# Patient Record
Sex: FEMALE | Race: WHITE | NOT HISPANIC OR LATINO | Employment: FULL TIME
[De-identification: names, ages, dates, MRNs, and addresses within clinical notes are randomized per-mention and may not be internally consistent; named-entity substitution may affect disease eponyms.]

---

## 2017-06-03 ENCOUNTER — HEALTH MAINTENANCE LETTER (OUTPATIENT)
Age: 52
End: 2017-06-03

## 2018-06-26 ASSESSMENT — ENCOUNTER SYMPTOMS
POLYPHAGIA: 0
FEVER: 1
HOARSE VOICE: 1
SINUS PAIN: 0
SORE THROAT: 0
HEMOPTYSIS: 0
WHEEZING: 0
DYSPNEA ON EXERTION: 0
NECK PAIN: 0
ALTERED TEMPERATURE REGULATION: 0
SINUS CONGESTION: 1
HALLUCINATIONS: 0
COUGH: 1
JOINT SWELLING: 1
STIFFNESS: 1
SNORES LOUDLY: 1
POSTURAL DYSPNEA: 0
SPUTUM PRODUCTION: 0
SMELL DISTURBANCE: 0
CHILLS: 0
TASTE DISTURBANCE: 0
FATIGUE: 1
WEIGHT GAIN: 0
COUGH DISTURBING SLEEP: 1
MUSCLE CRAMPS: 1
POLYDIPSIA: 0
WEIGHT LOSS: 0
TROUBLE SWALLOWING: 0
MUSCLE WEAKNESS: 0
DECREASED APPETITE: 0
NECK MASS: 0
INCREASED ENERGY: 1
BACK PAIN: 0
MYALGIAS: 1
ARTHRALGIAS: 1
SHORTNESS OF BREATH: 0
NIGHT SWEATS: 1

## 2018-06-28 ENCOUNTER — OFFICE VISIT (OUTPATIENT)
Dept: INFECTIOUS DISEASES | Facility: CLINIC | Age: 53
End: 2018-06-28
Attending: INTERNAL MEDICINE
Payer: COMMERCIAL

## 2018-06-28 VITALS
OXYGEN SATURATION: 95 % | HEIGHT: 66 IN | WEIGHT: 196.5 LBS | DIASTOLIC BLOOD PRESSURE: 82 MMHG | TEMPERATURE: 98.7 F | SYSTOLIC BLOOD PRESSURE: 118 MMHG | HEART RATE: 63 BPM | RESPIRATION RATE: 18 BRPM | BODY MASS INDEX: 31.58 KG/M2

## 2018-06-28 DIAGNOSIS — D84.9 IMMUNOSUPPRESSED STATUS (H): ICD-10-CM

## 2018-06-28 DIAGNOSIS — M06.9 RHEUMATOID ARTHRITIS INVOLVING MULTIPLE SITES, UNSPECIFIED RHEUMATOID FACTOR PRESENCE: ICD-10-CM

## 2018-06-28 DIAGNOSIS — B38.9 COCCIDIOIDOMYCOSIS: Primary | ICD-10-CM

## 2018-06-28 DIAGNOSIS — B38.9 COCCIDIOIDOMYCOSIS: ICD-10-CM

## 2018-06-28 LAB
ALBUMIN SERPL-MCNC: 4.4 G/DL (ref 3.4–5)
ALP SERPL-CCNC: 104 U/L (ref 40–150)
ALT SERPL W P-5'-P-CCNC: 35 U/L (ref 0–50)
ANION GAP SERPL CALCULATED.3IONS-SCNC: 5 MMOL/L (ref 3–14)
AST SERPL W P-5'-P-CCNC: 28 U/L (ref 0–45)
BASOPHILS # BLD AUTO: 0 10E9/L (ref 0–0.2)
BASOPHILS NFR BLD AUTO: 0.3 %
BILIRUB SERPL-MCNC: 0.4 MG/DL (ref 0.2–1.3)
BUN SERPL-MCNC: 22 MG/DL (ref 7–30)
CALCIUM SERPL-MCNC: 10.4 MG/DL (ref 8.5–10.1)
CHLORIDE SERPL-SCNC: 106 MMOL/L (ref 94–109)
CO2 SERPL-SCNC: 29 MMOL/L (ref 20–32)
CREAT SERPL-MCNC: 0.82 MG/DL (ref 0.52–1.04)
DIFFERENTIAL METHOD BLD: NORMAL
EOSINOPHIL # BLD AUTO: 0.1 10E9/L (ref 0–0.7)
EOSINOPHIL NFR BLD AUTO: 1.2 %
ERYTHROCYTE [DISTWIDTH] IN BLOOD BY AUTOMATED COUNT: 11.9 % (ref 10–15)
GFR SERPL CREATININE-BSD FRML MDRD: 73 ML/MIN/1.7M2
GLUCOSE SERPL-MCNC: 88 MG/DL (ref 70–99)
HCT VFR BLD AUTO: 45.3 % (ref 35–47)
HGB BLD-MCNC: 15.5 G/DL (ref 11.7–15.7)
IMM GRANULOCYTES # BLD: 0 10E9/L (ref 0–0.4)
IMM GRANULOCYTES NFR BLD: 0.2 %
LYMPHOCYTES # BLD AUTO: 2.7 10E9/L (ref 0.8–5.3)
LYMPHOCYTES NFR BLD AUTO: 43.9 %
MCH RBC QN AUTO: 31.6 PG (ref 26.5–33)
MCHC RBC AUTO-ENTMCNC: 34.2 G/DL (ref 31.5–36.5)
MCV RBC AUTO: 92 FL (ref 78–100)
MONOCYTES # BLD AUTO: 0.4 10E9/L (ref 0–1.3)
MONOCYTES NFR BLD AUTO: 7 %
NEUTROPHILS # BLD AUTO: 2.9 10E9/L (ref 1.6–8.3)
NEUTROPHILS NFR BLD AUTO: 47.4 %
NRBC # BLD AUTO: 0 10*3/UL
NRBC BLD AUTO-RTO: 0 /100
PLATELET # BLD AUTO: 180 10E9/L (ref 150–450)
POTASSIUM SERPL-SCNC: 4.8 MMOL/L (ref 3.4–5.3)
PROT SERPL-MCNC: 8.1 G/DL (ref 6.8–8.8)
RBC # BLD AUTO: 4.9 10E12/L (ref 3.8–5.2)
SODIUM SERPL-SCNC: 141 MMOL/L (ref 133–144)
WBC # BLD AUTO: 6 10E9/L (ref 4–11)

## 2018-06-28 PROCEDURE — G0463 HOSPITAL OUTPT CLINIC VISIT: HCPCS | Mod: ZF

## 2018-06-28 PROCEDURE — 85025 COMPLETE CBC W/AUTO DIFF WBC: CPT | Performed by: INTERNAL MEDICINE

## 2018-06-28 PROCEDURE — 36415 COLL VENOUS BLD VENIPUNCTURE: CPT | Performed by: INTERNAL MEDICINE

## 2018-06-28 PROCEDURE — 80053 COMPREHEN METABOLIC PANEL: CPT | Performed by: INTERNAL MEDICINE

## 2018-06-28 RX ORDER — FLUCONAZOLE 200 MG/1
400 TABLET ORAL AT BEDTIME
Qty: 60 TABLET | Refills: 11 | Status: SHIPPED | OUTPATIENT
Start: 2018-06-28 | End: 2018-09-26

## 2018-06-28 RX ORDER — ABATACEPT 125 MG/ML
125 INJECTION, SOLUTION SUBCUTANEOUS WEEKLY
COMMUNITY
Start: 2018-06-16 | End: 2023-07-17

## 2018-06-28 RX ORDER — FLUCONAZOLE 100 MG/1
100 TABLET ORAL DAILY
COMMUNITY
Start: 2018-06-27 | End: 2018-09-26

## 2018-06-28 ASSESSMENT — PAIN SCALES - GENERAL: PAINLEVEL: NO PAIN (0)

## 2018-06-28 NOTE — NURSING NOTE
"Chief Complaint   Patient presents with     Consult     Coccidioides infection       /82 (BP Location: Right arm, Patient Position: Sitting, Cuff Size: Adult Large)  Pulse 63  Temp 98.7  F (37.1  C) (Oral)  Resp 18  Ht 1.664 m (5' 5.5\")  Wt 89.1 kg (196 lb 8 oz)  SpO2 95%  BMI 32.2 kg/m2    Sindi Gomes [facility]  6/28/2018 4:38 PM      "

## 2018-06-28 NOTE — LETTER
6/28/2018      RE: Kady Marshalltvliet  [address] [facility] [address]       No notes on file    Errol Rashid MD

## 2018-06-28 NOTE — LETTER
"6/28/2018    RE: Kady Benson  [address] [facility] [address]       Division of Infectious Diseases and International Medicine  Department of Medicine    INFECTIOUS DISEASE CLINIC   OUTPATIENT CONSULTATION NOTE [place] Mail Code [address] [facility] [place] [address]  Office: [phone]  Fax:  [phone]     HISTORY OF PRESENT ILLNESS:  Ms. Kady Benson is a pleasant 53-year-old woman with with Rheumatoid Arthritis who is referred to Infectious Disease Clinic today by Dr. Taylor Lopez of [facility] and Dr. Ravi Luna of [facility], for ID consultation regarding a recent diagnosis of coccidioidomycosis.  She has taken Humira through Dr. Luna for the past two years or so.  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  Since late winter, she has had a persistent coarse cough that she attributed, while still in [place], to ongoing allergies to desert plants or dust.  She also had a \"nasty\" erythematous-papular face rash in the late winter that has subsequently fully resolved.  She had some mild fatigue, but no other constitutional symptoms, including no fever, chills, night sweats, anorexia, or weight loss, and also no other pulmonary symptoms, including dyspnea or chest discomfort.  [place] in [place], the cough was still quite troublesome and not improving after more than a couple of months, and she lacked other allergy symptoms, so she was seen in [place] where some tests for chronic pulmonary infections were performed. I do not have access to those tests at present (I cannot find the appropriate system in Care Everywhere and I do not see that we have yet " "received a fax from Dr. Cervantes's office with the records she obtained from [place]), but per the patient and an [facility] chart Telephone Note (in Care Everywhere) from 5/31/18 by Dr. Lopez, those [place] tests were reportedly all negative except for a \"culture\" that apparently grew Coccidioides imitis (or perhaps it was a positive antibody assay).  Ms. Benson phoned Dr. Lopez's office on 5/31/18 asking for treatment for this infection and fluconazole 100 mg PO daily was prescribed, which she started taking three weeks ago.  The fluconazole makes her feel \"dizzy\", but she can tolerate that side effect easily by taking the fuconazole dose at bedtime, and with that dosing plan, she has not experienced any other drug side effects. In addition to initiating the antifungal, Dr. Lopez also referred her to our ID Clinic for today's consultation.  Because of the cocci diagnosis, Dr. Luna discontinued her long-standing Humira on 5/29/18 and she was off any biologic therapy for about three weeks.  She then resumed treatment for her rheumatoid arthritis with Orencia injections instead on 6/22/18.  Ms. Benson today says that her cough has markedly slowly improved and almost resolved over the past two weeks and that she now feels pretty well.  She says she feels the best in the past couple of days or so that she has felt in several months.  She has no recent constitutional symptoms, including no fever, chills, night sweats, anorexia, or fatigue.  She now has no other complaints today, including no recent rash, myalgias/arthralgias, significant exertional dyspnea, EENT symptoms, chest pain, nausea, diarrhea, abdominal pain, genitourinary symptoms, headache or other neurological symptoms.    PAST MEDICAL HISTORY:  Past sinusitis  History of sarcoidosis in asymptomatic remission for a number of years.  Past Medical History:   Diagnosis Date     Angle recession glaucoma of left eye      Arthropathy     multiple " sites     Depression      Glaucoma suspect      RA (rheumatoid arthritis) (H) 1/4/2012     Past Surgical History:   Procedure Laterality Date     APPENDECTOMY       ARTHROPLASTY KNEE  11/29/2012    Procedure: ARTHROPLASTY KNEE;  Right Total Knee Arthroplasty,  Left Knee Steroid Injection   ;  Surgeon: Florence Billings MD;  [facility]     ARTHROSCOPY ANKLE  3/26/2014    Procedure: ARTHROSCOPY ANKLE;;  Surgeon: Christopher Peres MD;  [facility]     ARTHROTOMY FOOT  3/26/2014    Procedure: ARTHROTOMY FOOT;  Right 1st Metatarsal Phalangeal Joint Debridement, Left Ankle Arthroscopy & Debridement, Left 2nd And 3rd Metatarsal Shortening, Left 2nd And 3rd Hammer Toe Correction Surgery   ;  Surgeon: Christopher Peres MD;  [facility]     GYN SURGERY       INJECT STEROID (LOCATION)  11/29/2012    Procedure: INJECT STEROID (LOCATION);;  Surgeon: Florence Billings MD;  [facility]     MEDIASTINOSCOPY N/A 8/20/2014    Procedure: MEDIASTINOSCOPY;  Surgeon: Christiano Hernandez MD;  [facility]     ORTHOPEDIC SURGERY       REPAIR HAMMER TOE  3/26/2014    Procedure: REPAIR HAMMER TOE;;  Surgeon: Christopher Peres MD;  [facility]   NAHOMY with RSO (for endometriosis) 2005    ALLERGIES:  Allergies   Allergen Reactions     Shellfish Allergy Anaphylaxis     Iodine-131 Other (See Comments)     Sulfasalazine Rash     Vancomycin Rash     Wellbutrin [Bupropion] Rash     MEDICATIONS:  Fluconazole dose increased today from 100 mg PO daily to 400 mg PO daily.  Current Outpatient Prescriptions   Medication Sig Dispense Refill     acetaminophen (TYLENOL ARTHRITIS PAIN) 650 MG CR tablet Take 650 mg by mouth every 8 hours as needed.       amoxicillin (AMOXIL) 500 MG tablet Take 2 grams one hour before procedure 4 tablet 2     Artificial Tear Solution (HYPOTEARS PF OP) Apply  to eye as needed.       CALCIUM PO Take 1 tablet by mouth daily       fish oil-omega-3 fatty acids 1000 MG capsule Take  "2 g by mouth daily       fluconazole (DIFLUCAN) 100 MG tablet Take 100 mg by mouth daily       fluconazole (DIFLUCAN) 200 MG tablet Take 2 tablets (400 mg) by mouth At Bedtime 60 tablet 11     glucosamine-chondroitin 500-400 MG CAPS Take 1 capsule by mouth daily Shellfish free       MAGNESIUM PO Take 1 tablet by mouth 2 times daily        Milk Thistle 175 MG TABS Take by mouth daily       Multiple Vitamin (MULTIVITAMIN) per tablet Take 1 tablet by mouth daily.       naproxen sodium (ALEVE) 220 MG capsule Take 220 mg by mouth daily as needed (pain)        ORENCIA CLICKJECT 125 MG/ML SOAJ auto-injector Take 125 mg by mouth once a week        Turmeric 500 MG CAPS Take by mouth daily        SOCIAL HISTORY:  Social History     Social History     Marital status: Single     Spouse name: N/A     Number of children: N/A     Years of education: N/A     Occupational History     Not on file.     Social History Main Topics     Smoking status: Never Smoker     Smokeless tobacco: Never Used     Alcohol use Yes      Comment: 1 a day     Drug use: No     Sexual activity: Not on file     Other Topics Concern     Not on file     Social History Narrative   Retired clinic manager and lymphoma researcher.  Accompanied to clinic today by her spouse, Aleena.    FAMILY HISTORY:  Family History   Problem Relation Age of Onset     Uterine Cancer Mother      Skin Cancer Father      HEART DISEASE Maternal Grandmother      HEART DISEASE Paternal Grandmother      Prostate Cancer Paternal Grandfather      Stomach Cancer Paternal Grandfather      REVIEW OF SYSTEMS:  As per HPI.  Complete ROS is otherwise negative.    PHYSICAL EXAMINATION:  General:  A personable, articulate, WDWN, 53-year-old woman in NAD.  Vital signs:  /82 (BP Location: Right arm, Patient Position: Sitting, Cuff Size: Adult Large)  Pulse 63  Temp 98.7  F (37.1  C) (Oral)  Resp 18  Ht 1.664 m (5' 5.5\")  Wt 89.1 kg (196 lb 8 oz)  SpO2 95%  BMI 32.2 kg/m2 .  Skin:  No " acute rash or lesion.  Head/Neck:  NCAT.  External auditory canals are patent without discharge.  PERRL.  EOMI.  Conjunctivae are pink without injection.  Sclerae are white.  Oropharynx lacks erythema, exudate, or lesion.  Dentition is in good repair.  Neck is supple without thyromegaly.  Lymph:  No cervical, supraclavicular, or axillary lymphadenopathy.  Lungs:  Bilaterally clear to auscultation.  CV:  RRR.  Normal S1, S2, without gallop, murmur, or rub.  Abdomen:  Bowel sounds active, soft, nontender, no hepatosplenomegaly.  Back:  No lumbar or CVA tenderness.  Extremities:  Distally warm, no edema.  Neuro:  Alert, oriented, memory/cognition/affect are normal, gait is normal.    LABORATORY STUDIES:      WBC 06/28/2018 6.0  4.0 - 11.0 10e9/L Final     RBC Count 06/28/2018 4.90  3.8 - 5.2 10e12/L Final     Hemoglobin 06/28/2018 15.5  11.7 - 15.7 g/dL Final     Hematocrit 06/28/2018 45.3  35.0 - 47.0 % Final     MCV 06/28/2018 92  78 - 100 fl Final     MCH 06/28/2018 31.6  26.5 - 33.0 pg Final     MCHC 06/28/2018 34.2  31.5 - 36.5 g/dL Final     RDW 06/28/2018 11.9  10.0 - 15.0 % Final     Platelet Count 06/28/2018 180  150 - 450 10e9/L Final     Diff Method 06/28/2018 Automated Method   Final     % Neutrophils 06/28/2018 47.4  % Final     % Lymphocytes 06/28/2018 43.9  % Final     % Monocytes 06/28/2018 7.0  % Final     % Eosinophils 06/28/2018 1.2  % Final     % Basophils 06/28/2018 0.3  % Final     % Immature Granulocytes 06/28/2018 0.2  % Final     Nucleated RBCs 06/28/2018 0  0 /100 Final     Absolute Neutrophil 06/28/2018 2.9  1.6 - 8.3 10e9/L Final     Absolute Lymphocytes 06/28/2018 2.7  0.8 - 5.3 10e9/L Final     Absolute Monocytes 06/28/2018 0.4  0.0 - 1.3 10e9/L Final     Absolute Eosinophils 06/28/2018 0.1  0.0 - 0.7 10e9/L Final     Absolute Basophils 06/28/2018 0.0  0.0 - 0.2 10e9/L Final     Abs Immature Granulocytes 06/28/2018 0.0  0 - 0.4 10e9/L Final     Absolute Nucleated RBC 06/28/2018 0.0   Final      Sodium 06/28/2018 141  133 - 144 mmol/L Final     Potassium 06/28/2018 4.8  3.4 - 5.3 mmol/L Final     Chloride 06/28/2018 106  94 - 109 mmol/L Final     Carbon Dioxide 06/28/2018 29  20 - 32 mmol/L Final     Anion Gap 06/28/2018 5  3 - 14 mmol/L Final     Glucose 06/28/2018 88  70 - 99 mg/dL Final     Urea Nitrogen 06/28/2018 22  7 - 30 mg/dL Final     Creatinine 06/28/2018 0.82  0.52 - 1.04 mg/dL Final     GFR Estimate 06/28/2018 73  >60 mL/min/1.7m2 Final    Non  GFR Calc     GFR Estimate If Black 06/28/2018 88  >60 mL/min/1.7m2 Final    African American GFR Calc     Calcium 06/28/2018 10.4* 8.5 - 10.1 mg/dL Final     Bilirubin Total 06/28/2018 0.4  0.2 - 1.3 mg/dL Final     Albumin 06/28/2018 4.4  3.4 - 5.0 g/dL Final     Protein Total 06/28/2018 8.1  6.8 - 8.8 g/dL Final     Alkaline Phosphatase 06/28/2018 104  40 - 150 U/L Final     ALT 06/28/2018 35  0 - 50 U/L Final     AST 06/28/2018 28  0 - 45 U/L Final     Microbiology / Mycology assays from outside [facility] are not presently available to me.    IMPRESSION/PLAN:  A pleasant 53 year old woman on long-standing (until 5/29/18) immunosuppression with Humira for rheumatoid arthritis who lives much of the year in [place] presents to Infectious Disease Clinic today for consultation regarding a diagnosis of primary pulmonary coccidioidomycosis.  I am presently unable to evaluate the strength of this diagnosis, since the diagnostic laboratory results and radiology are not at the moment available to me.  But Ms. Benson is a sophisticated historian with medical knowledge and I have the confirmatory conclusion of her primary physician, Dr. Lopez's, on 5/31/18 in citing the diagnosis without doubt in a note (when she referred the patient here).  In addition, her history seems highly compatible with a diagnosis of [place] Fever in an individual on a biologic agent for immunosuppression.  With either the initiation of the low-dose fluconazole on ~  6/8/18 or with discontinuation of Humira on 5/29/18, she seems to be responding to therapy, which is also comfirmatory of the diagnosis of coccidioidomycosis.  We will try to obtain the additional documentation from Dr. Lopez's office soon, but I do not see a need to wait for that to now continue to treat Ms. Benson with full antifungal therapy.  Dr. Lopez provided fluconazole which is, indeed, apt first-line antifungal therapy, although the dose she has been taking is sub-optimal.  We will increase the dose to 400 mg PO daily -- we discussed that she can either take this as 200 mg PO BID, or, if she wishes, can take it as 400 mg PO qHS.  Since she has found that she can ameliorate the only Diflucan side effect she has experienced so far (dizziness) by taking the dose at bedtime, she will likely dose it that way.  She is strongly encouraged to phone me quickly if she develops worsened dizziness or any other possible new side effect on the increased fluconazole dose.  We obtained a baseline metabolic panel (especially LFTs) and CBC today and will plan to repeat those tests in two weeks (and again about four weeks later) on the higher fluconazole dose.  We discussed today that in someone on an ongoing biologic agent, such as Humira (or less so, but also Orencia which she is on now since 6/22/18), we usually treat primary pulmonary coccidioidomycosis for six to twelve months.  In the meanwhile, I do not think she needs to avoid appropriate immunosuppressive therapy for her RA, although, if she can maintain good arthritis control with Orencia, the literature suggests that may be a better agent from the standpoint of curing the coccidioidomycosis down the road.  We may likely repeat some chest imaging in about three to six months.  Assuming she tolerates the fluconazole well, she will continue taking it and return to ID Clinic for follow-up with me in about three months, or anytime needed before then.      Errol COLES  MD Gay

## 2018-06-28 NOTE — MR AVS SNAPSHOT
After Visit Summary   6/28/2018    Kady Benson    MRN: [id number]           Patient Information     Date Of Birth          1965        Visit Information        Provider Department      6/28/2018 4:15 PM Errol Rashid MD [facility] and Infectious Diseases        Today's Diagnoses     Coccidioidomycosis    -  1    Immunosuppressed status (H)        Rheumatoid arthritis involving multiple sites, unspecified rheumatoid factor presence (H)           Follow-ups after your visit        Follow-up notes from your care team     Return in about 3 months (around 9/28/2018).      Your next 10 appointments already scheduled     Sep 26, 2018  3:30 PM CDT   (Arrive by 3:15 PM)   [facility] ([facility]    [address] [place] [address]  [address]  [facility] [address] [phone]              Who to contact     If you have questions or need follow up information about today's clinic visit or your schedule please contact [facility] AND INFECTIOUS DISEASES directly at [phone].  Normal or non-critical lab and imaging results will be communicated to you by Jinnhart, letter or phone within 4 business days after the clinic has received the results. If you do not hear from us within 7 days, please contact the clinic through Jinnhart or phone. If you have a critical or abnormal lab result, we will notify you by phone as soon as possible.  Submit refill requests through Touchmedia or call your pharmacy and they will forward the refill request to us. Please allow 3 business days for your refill to be completed.          Additional Information About Your Visit        MyChart Information     Touchmedia gives you secure access to your electronic health record. If you see a primary care provider, you can also send messages to your care team and make appointments. If you have questions,  "please call your primary care clinic.  If you do not have a primary care provider, please call [phone] and they will assist you.        Care EveryWhere ID     This is your Care EveryWhere ID. This could be used by other organizations to access your [facility] medical records  [id number]        Your Vitals Were     Pulse Temperature Respirations Height Pulse Oximetry BMI (Body Mass Index)    63 98.7  F (37.1  C) (Oral) 18 1.664 m (5' 5.5\") 95% 32.2 kg/m2       Blood Pressure from Last 3 Encounters:   06/28/18 118/82   06/12/15 125/87   04/16/15 129/74    Weight from Last 3 Encounters:   06/28/18 89.1 kg (196 lb 8 oz)   10/04/16 85.3 kg (188 lb)   06/12/15 83.3 kg (183 lb 9.6 oz)                 Today's Medication Changes          These changes are accurate as of 6/28/18 11:59 PM.  If you have any questions, ask your nurse or doctor.               These medicines have changed or have updated prescriptions.        Dose/Directions    * fluconazole 100 MG tablet   Commonly known as:  DIFLUCAN   This may have changed:  Another medication with the same name was added. Make sure you understand how and when to take each.   Used for:  Coccidioidomycosis, Immunosuppressed status (H)   Changed by:  Errol Rashid MD        Dose:  100 mg   Take 100 mg by mouth daily   Refills:  0       * fluconazole 200 MG tablet   Commonly known as:  DIFLUCAN   This may have changed:  You were already taking a medication with the same name, and this prescription was added. Make sure you understand how and when to take each.   Used for:  Coccidioidomycosis, Immunosuppressed status (H)   Changed by:  Errol Rashid MD        Dose:  400 mg   Take 2 tablets (400 mg) by mouth At Bedtime   Quantity:  60 tablet   Refills:  11       * Notice:  This list has 2 medication(s) that are the same as other medications prescribed for you. Read the directions carefully, and ask your doctor or other care provider to review them with you.       "   Where to get your medicines      These medications were sent to [place], [place] - [address]  [address], [facility] [address]    Hours:  TRANSPLANT PHONE NUMBER [phone] Phone:  [phone]     fluconazole 200 MG tablet                Primary Care Provider Office Phone # Fax #    Frna Luna [phone]       [facility] RHEUMATOLOGY [address]  [place] [address]        Equal Access to Services     JIGNA SHARP AH: Hadii aad ku hadasho Soomaali, waaxda luqadaha, qaybta kaalmada adeegyada, waxay idiin hayaan adeeg kharash la'maria fernanda . So [facility] [phone].    ATENCIÓN: Si habla español, tiene a mcmillan disposición servicios gratuitos de asistencia lingüística. Llame al [phone].    We comply with applicable federal civil rights laws and [place] laws. We do not discriminate on the basis of race, color, national origin, age, disability, sex, sexual orientation, or gender identity.            Thank you!     Thank you for choosing [facility] AND INFECTIOUS DISEASES  for your care. Our goal is always to provide you with excellent care. Hearing back from our patients is one way we can continue to improve our services. Please take a few minutes to complete the written survey that you may receive in the mail after your visit with us. Thank you!             Your Updated Medication List - Protect others around you: Learn how to safely use, store and throw away your medicines at www.disposemymeds.org.          This list is accurate as of 6/28/18 11:59 PM.  Always use your most recent med list.                   Brand Name Dispense Instructions for use Diagnosis    ALEVE 220 MG capsule   Generic drug:  naproxen sodium      Take 220 mg by mouth daily as needed (pain)    Sarcoidosis       amoxicillin 500 MG tablet    AMOXIL    4 tablet    Take 2 grams one hour before procedure    S/P knee surgery       CALCIUM PO       Take 1 tablet by mouth daily        fish oil-omega-3 fatty acids 1000 MG capsule      Take 2 g by mouth daily        * fluconazole 100 MG tablet    DIFLUCAN     Take 100 mg by mouth daily    Coccidioidomycosis, Immunosuppressed status (H)       * fluconazole 200 MG tablet    DIFLUCAN    60 tablet    Take 2 tablets (400 mg) by mouth At Bedtime    Coccidioidomycosis, Immunosuppressed status (H)       glucosamine-chondroitin 500-400 MG Caps per capsule      Take 1 capsule by mouth daily Shellfish free        HYPOTEARS PF OP      Apply  to eye as needed.        MAGNESIUM PO      Take 1 tablet by mouth 2 times daily        Milk Thistle 175 MG Tabs      Take by mouth daily        multivitamin per tablet      Take 1 tablet by mouth daily.        ORENCIA CLICKJECT 125 MG/ML Soaj auto-injector   Generic drug:  Abatacept      Take 125 mg by mouth once a week    Coccidioidomycosis, Immunosuppressed status (H)       Turmeric 500 MG Caps      Take by mouth daily        TYLENOL ARTHRITIS PAIN 650 MG CR tablet   Generic drug:  acetaminophen      Take 650 mg by mouth every 8 hours as needed.        * Notice:  This list has 2 medication(s) that are the same as other medications prescribed for you. Read the directions carefully, and ask your doctor or other care provider to review them with you.

## 2018-06-28 NOTE — Clinical Note
6/28/2018       RE: Kady Benson  [address] [facility] [address]     Dear Colleague,    Thank you for referring your patient, Kady Benson, to the [facility] AND INFECTIOUS DISEASES at [facility]. Please see a copy of my visit note below.    No notes on file    Again, thank you for allowing me to participate in the care of your patient.      Sincerely,    Errol Rashid MD

## 2018-07-12 DIAGNOSIS — B38.9 COCCIDIOIDOMYCOSIS: ICD-10-CM

## 2018-07-12 LAB
ALBUMIN SERPL-MCNC: 4 G/DL (ref 3.4–5)
ALP SERPL-CCNC: 88 U/L (ref 40–150)
ALT SERPL W P-5'-P-CCNC: 25 U/L (ref 0–50)
ANION GAP SERPL CALCULATED.3IONS-SCNC: 6 MMOL/L (ref 3–14)
AST SERPL W P-5'-P-CCNC: 25 U/L (ref 0–45)
BASOPHILS # BLD AUTO: 0 10E9/L (ref 0–0.2)
BASOPHILS NFR BLD AUTO: 0.2 %
BILIRUB SERPL-MCNC: 0.5 MG/DL (ref 0.2–1.3)
BUN SERPL-MCNC: 20 MG/DL (ref 7–30)
CALCIUM SERPL-MCNC: 10.4 MG/DL (ref 8.5–10.1)
CHLORIDE SERPL-SCNC: 107 MMOL/L (ref 94–109)
CO2 SERPL-SCNC: 27 MMOL/L (ref 20–32)
CREAT SERPL-MCNC: 0.93 MG/DL (ref 0.52–1.04)
DIFFERENTIAL METHOD BLD: NORMAL
EOSINOPHIL # BLD AUTO: 0.1 10E9/L (ref 0–0.7)
EOSINOPHIL NFR BLD AUTO: 1.4 %
ERYTHROCYTE [DISTWIDTH] IN BLOOD BY AUTOMATED COUNT: 12.1 % (ref 10–15)
GFR SERPL CREATININE-BSD FRML MDRD: 63 ML/MIN/1.7M2
GLUCOSE SERPL-MCNC: 94 MG/DL (ref 70–99)
HCT VFR BLD AUTO: 43.2 % (ref 35–47)
HGB BLD-MCNC: 14.8 G/DL (ref 11.7–15.7)
IMM GRANULOCYTES # BLD: 0 10E9/L (ref 0–0.4)
IMM GRANULOCYTES NFR BLD: 0.2 %
LYMPHOCYTES # BLD AUTO: 2.4 10E9/L (ref 0.8–5.3)
LYMPHOCYTES NFR BLD AUTO: 41.4 %
MCH RBC QN AUTO: 32 PG (ref 26.5–33)
MCHC RBC AUTO-ENTMCNC: 34.3 G/DL (ref 31.5–36.5)
MCV RBC AUTO: 93 FL (ref 78–100)
MONOCYTES # BLD AUTO: 0.4 10E9/L (ref 0–1.3)
MONOCYTES NFR BLD AUTO: 7.6 %
NEUTROPHILS # BLD AUTO: 2.9 10E9/L (ref 1.6–8.3)
NEUTROPHILS NFR BLD AUTO: 49.2 %
NRBC # BLD AUTO: 0 10*3/UL
NRBC BLD AUTO-RTO: 0 /100
PLATELET # BLD AUTO: 176 10E9/L (ref 150–450)
POTASSIUM SERPL-SCNC: 4.3 MMOL/L (ref 3.4–5.3)
PROT SERPL-MCNC: 7.8 G/DL (ref 6.8–8.8)
RBC # BLD AUTO: 4.63 10E12/L (ref 3.8–5.2)
SODIUM SERPL-SCNC: 139 MMOL/L (ref 133–144)
WBC # BLD AUTO: 5.8 10E9/L (ref 4–11)

## 2018-07-12 NOTE — PROGRESS NOTES
"Division of Infectious Diseases and International Medicine  Department of Medicine        INFECTIOUS DISEASE CLINIC OUTPATIENT CONSULTATION NOTE [address]  [address] [place] [address]  Office: [phone]  Fax:  [phone]     HISTORY OF PRESENT ILLNESS:  Ms. Kady Benson is a pleasant 53-year-old woman with with Rheumatoid Arthritis who is referred to Infectious Disease Clinic today by Dr. Taylor Lopez of [facility] and Dr. Ravi Luna of [facility], for ID consultation regarding a recent diagnosis of coccidioidomycosis.  She has taken Humira through Dr. Luna for the past two years or so.  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  Since late winter, she has had a persistent coarse cough that she attributed, while still in [place], to ongoing allergies to desert plants or dust.  She also had a \"nasty\" erythematous-papular face rash in the late winter that has subsequently fully resolved.  She had some mild fatigue, but no other constitutional symptoms, including no fever, chills, night sweats, anorexia, or weight loss, and also no other pulmonary symptoms, including dyspnea or chest discomfort.  [place] in [place], the cough was still quite troublesome and not improving after more than a couple of months, and she lacked other allergy symptoms, so she was seen in [place] where some tests for chronic pulmonary infections were performed. I do not have access to those tests at present (I cannot find the appropriate system in Care Everywhere and I do not see that we have yet received a fax from Dr. Cervantes's office with the records she obtained from [facility] " "[place]), but per the patient and an [facility] chart Telephone Note (in Care Everywhere) from 5/31/18 by Dr. Lopez, those [place] tests were reportedly all negative except for a \"culture\" that apparently grew Coccidioides imitis (or perhaps it was a positive antibody assay).  Ms. Benson phoned Dr. Lopez's office on 5/31/18 asking for treatment for this infection and fluconazole 100 mg PO daily was prescribed, which she started taking three weeks ago.  The fluconazole makes her feel \"dizzy\", but she can tolerate that side effect easily by taking the fuconazole dose at bedtime, and with that dosing plan, she has not experienced any other drug side effects. In addition to initiating the antifungal, Dr. Lopez also referred her to our ID Clinic for today's consultation.  Because of the cocci diagnosis, Dr. Luna discontinued her long-standing Humira on 5/29/18 and she was off any biologic therapy for about three weeks.  She then resumed treatment for her rheumatoid arthritis with Orencia injections instead on 6/22/18.  Ms. Benson today says that her cough has markedly slowly improved and almost resolved over the past two weeks and that she now feels pretty well.  She says she feels the best in the past couple of days or so that she has felt in several months.  She has no recent constitutional symptoms, including no fever, chills, night sweats, anorexia, or fatigue.  She now has no other complaints today, including no recent rash, myalgias/arthralgias, significant exertional dyspnea, EENT symptoms, chest pain, nausea, diarrhea, abdominal pain, genitourinary symptoms, headache or other neurological symptoms.    PAST MEDICAL HISTORY:  Past sinusitis  History of sarcoidosis in asymptomatic remission for a number of years.  Past Medical History:   Diagnosis Date     Angle recession glaucoma of left eye      Arthropathy     multiple sites     Depression      Glaucoma suspect      RA (rheumatoid arthritis) (H) " 1/4/2012     Past Surgical History:   Procedure Laterality Date     APPENDECTOMY       ARTHROPLASTY KNEE  11/29/2012    Procedure: ARTHROPLASTY KNEE;  Right Total Knee Arthroplasty,  Left Knee Steroid Injection   ;  Surgeon: Florence Billings MD;  [facility]     ARTHROSCOPY ANKLE  3/26/2014    Procedure: ARTHROSCOPY ANKLE;;  Surgeon: Christopher Peres MD;  [facility]     ARTHROTOMY FOOT  3/26/2014    Procedure: ARTHROTOMY FOOT;  Right 1st Metatarsal Phalangeal Joint Debridement, Left Ankle Arthroscopy & Debridement, Left 2nd And 3rd Metatarsal Shortening, Left 2nd And 3rd Hammer Toe Correction Surgery   ;  Surgeon: Christopher Peres MD;  [facility]     GYN SURGERY       INJECT STEROID (LOCATION)  11/29/2012    Procedure: INJECT STEROID (LOCATION);;  Surgeon: Florence Billings MD;  [facility]     MEDIASTINOSCOPY N/A 8/20/2014    Procedure: MEDIASTINOSCOPY;  Surgeon: Christiano Hernandez MD;  [facility]     ORTHOPEDIC SURGERY       REPAIR HAMMER TOE  3/26/2014    Procedure: REPAIR HAMMER TOE;;  Surgeon: Christopher Peres MD;  Location: [facility]   [facility] with RSO (for endometriosis) 2005    ALLERGIES:  Allergies   Allergen Reactions     Shellfish Allergy Anaphylaxis     Iodine-131 Other (See Comments)     Sulfasalazine Rash     Vancomycin Rash     Wellbutrin [Bupropion] Rash     MEDICATIONS:  Fluconazole dose increased today from 100 mg PO daily to 400 mg PO daily.  Current Outpatient Prescriptions   Medication Sig Dispense Refill     acetaminophen (TYLENOL ARTHRITIS PAIN) 650 MG CR tablet Take 650 mg by mouth every 8 hours as needed.       amoxicillin (AMOXIL) 500 MG tablet Take 2 grams one hour before procedure 4 tablet 2     Artificial Tear Solution (HYPOTEARS PF OP) Apply  to eye as needed.       CALCIUM PO Take 1 tablet by mouth daily       fish oil-omega-3 fatty acids 1000 MG capsule Take 2 g by mouth daily       fluconazole (DIFLUCAN) 100 MG tablet Take 100 mg by  "mouth daily       fluconazole (DIFLUCAN) 200 MG tablet Take 2 tablets (400 mg) by mouth At Bedtime 60 tablet 11     glucosamine-chondroitin 500-400 MG CAPS Take 1 capsule by mouth daily Shellfish free       MAGNESIUM PO Take 1 tablet by mouth 2 times daily        Milk Thistle 175 MG TABS Take by mouth daily       Multiple Vitamin (MULTIVITAMIN) per tablet Take 1 tablet by mouth daily.       naproxen sodium (ALEVE) 220 MG capsule Take 220 mg by mouth daily as needed (pain)        ORENCIA CLICKJECT 125 MG/ML SOAJ auto-injector Take 125 mg by mouth once a week        Turmeric 500 MG CAPS Take by mouth daily        SOCIAL HISTORY:  Social History     Social History     Marital status: Single     Spouse name: N/A     Number of children: N/A     Years of education: N/A     Occupational History     Not on file.     Social History Main Topics     Smoking status: Never Smoker     Smokeless tobacco: Never Used     Alcohol use Yes      Comment: 1 a day     Drug use: No     Sexual activity: Not on file     Other Topics Concern     Not on file     Social History Narrative   Retired clinic manager and lymphoma researcher.  Accompanied to clinic today by her spouse, Aleena.    FAMILY HISTORY:  Family History   Problem Relation Age of Onset     Uterine Cancer Mother      Skin Cancer Father      HEART DISEASE Maternal Grandmother      HEART DISEASE Paternal Grandmother      Prostate Cancer Paternal Grandfather      Stomach Cancer Paternal Grandfather      REVIEW OF SYSTEMS:  As per HPI.  Complete ROS is otherwise negative.    PHYSICAL EXAMINATION:  General:  A personable, articulate, WDWN, 53-year-old woman in NAD.  Vital signs:  /82 (BP Location: Right arm, Patient Position: Sitting, Cuff Size: Adult Large)  Pulse 63  Temp 98.7  F (37.1  C) (Oral)  Resp 18  Ht 1.664 m (5' 5.5\")  Wt 89.1 kg (196 lb 8 oz)  SpO2 95%  BMI 32.2 kg/m2 .  Skin:  No acute rash or lesion.  Head/Neck:  NCAT.  External auditory canals are patent " without discharge.  PERRL.  EOMI.  Conjunctivae are pink without injection.  Sclerae are white.  Oropharynx lacks erythema, exudate, or lesion.  Dentition is in good repair.  Neck is supple without thyromegaly.  Lymph:  No cervical, supraclavicular, or axillary lymphadenopathy.  Lungs:  Bilaterally clear to auscultation.  CV:  RRR.  Normal S1, S2, without gallop, murmur, or rub.  Abdomen:  Bowel sounds active, soft, nontender, no hepatosplenomegaly.  Back:  No lumbar or CVA tenderness.  Extremities:  Distally warm, no edema.  Neuro:  Alert, oriented, memory/cognition/affect are normal, gait is normal.    LABORATORY STUDIES:      WBC 06/28/2018 6.0  4.0 - 11.0 10e9/L Final     RBC Count 06/28/2018 4.90  3.8 - 5.2 10e12/L Final     Hemoglobin 06/28/2018 15.5  11.7 - 15.7 g/dL Final     Hematocrit 06/28/2018 45.3  35.0 - 47.0 % Final     MCV 06/28/2018 92  78 - 100 fl Final     MCH 06/28/2018 31.6  26.5 - 33.0 pg Final     MCHC 06/28/2018 34.2  31.5 - 36.5 g/dL Final     RDW 06/28/2018 11.9  10.0 - 15.0 % Final     Platelet Count 06/28/2018 180  150 - 450 10e9/L Final     Diff Method 06/28/2018 Automated Method   Final     % Neutrophils 06/28/2018 47.4  % Final     % Lymphocytes 06/28/2018 43.9  % Final     % Monocytes 06/28/2018 7.0  % Final     % Eosinophils 06/28/2018 1.2  % Final     % Basophils 06/28/2018 0.3  % Final     % Immature Granulocytes 06/28/2018 0.2  % Final     Nucleated RBCs 06/28/2018 0  0 /100 Final     Absolute Neutrophil 06/28/2018 2.9  1.6 - 8.3 10e9/L Final     Absolute Lymphocytes 06/28/2018 2.7  0.8 - 5.3 10e9/L Final     Absolute Monocytes 06/28/2018 0.4  0.0 - 1.3 10e9/L Final     Absolute Eosinophils 06/28/2018 0.1  0.0 - 0.7 10e9/L Final     Absolute Basophils 06/28/2018 0.0  0.0 - 0.2 10e9/L Final     Abs Immature Granulocytes 06/28/2018 0.0  0 - 0.4 10e9/L Final     Absolute Nucleated RBC 06/28/2018 0.0   Final     Sodium 06/28/2018 141  133 - 144 mmol/L Final     Potassium 06/28/2018  4.8  3.4 - 5.3 mmol/L Final     Chloride 06/28/2018 106  94 - 109 mmol/L Final     Carbon Dioxide 06/28/2018 29  20 - 32 mmol/L Final     Anion Gap 06/28/2018 5  3 - 14 mmol/L Final     Glucose 06/28/2018 88  70 - 99 mg/dL Final     Urea Nitrogen 06/28/2018 22  7 - 30 mg/dL Final     Creatinine 06/28/2018 0.82  0.52 - 1.04 mg/dL Final     GFR Estimate 06/28/2018 73  >60 mL/min/1.7m2 Final    Non  GFR Calc     GFR Estimate If Black 06/28/2018 88  >60 mL/min/1.7m2 Final    African American GFR Calc     Calcium 06/28/2018 10.4* 8.5 - 10.1 mg/dL Final     Bilirubin Total 06/28/2018 0.4  0.2 - 1.3 mg/dL Final     Albumin 06/28/2018 4.4  3.4 - 5.0 g/dL Final     Protein Total 06/28/2018 8.1  6.8 - 8.8 g/dL Final     Alkaline Phosphatase 06/28/2018 104  40 - 150 U/L Final     ALT 06/28/2018 35  0 - 50 U/L Final     AST 06/28/2018 28  0 - 45 U/L Final     Microbiology / Mycology assays from outside [facility] are not presently available to me.    IMPRESSION/PLAN:  A pleasant 53 year old woman on long-standing (until 5/29/18) immunosuppression with Humira for rheumatoid arthritis who lives much of the year in [place] presents to Infectious Disease Clinic today for consultation regarding a diagnosis of primary pulmonary coccidioidomycosis.  I am presently unable to evaluate the strength of this diagnosis, since the diagnostic laboratory results and radiology are not at the moment available to me.  But Ms. Benson is a sophisticated historian with medical knowledge and I have the confirmatory conclusion of her primary physician, Dr. Lopez's, on 5/31/18 in citing the diagnosis without doubt in a note (when she referred the patient here).  In addition, her history seems highly compatible with a diagnosis of Valley Fever in an individual on a biologic agent for immunosuppression.  With either the initiation of the low-dose fluconazole on ~ 6/8/18 or with discontinuation of Humira on 5/29/18, she seems to be  responding to therapy, which is also comfirmatory of the diagnosis of coccidioidomycosis.  We will try to obtain the additional documentation from Dr. Lopez's office soon, but I do not see a need to wait for that to now continue to treat Ms. Benson with full antifungal therapy.  Dr. Lopez provided fluconazole which is, indeed, apt first-line antifungal therapy, although the dose she has been taking is sub-optimal.  We will increase the dose to 400 mg PO daily -- we discussed that she can either take this as 200 mg PO BID, or, if she wishes, can take it as 400 mg PO qHS.  Since she has found that she can ameliorate the only Diflucan side effect she has experienced so far (dizziness) by taking the dose at bedtime, she will likely dose it that way.  She is strongly encouraged to phone me quickly if she develops worsened dizziness or any other possible new side effect on the increased fluconazole dose.  We obtained a baseline metabolic panel (especially LFTs) and CBC today and will plan to repeat those tests in two weeks (and again about four weeks later) on the higher fluconazole dose.  We discussed today that in someone on an ongoing biologic agent, such as Humira (or less so, but also Orencia which she is on now since 6/22/18), we usually treat primary pulmonary coccidioidomycosis for six to twelve months.  In the meanwhile, I do not think she needs to avoid appropriate immunosuppressive therapy for her RA, although, if she can maintain good arthritis control with Orencia, the literature suggests that may be a better agent from the standpoint of curing the coccidioidomycosis down the road.  We may likely repeat some chest imaging in about three to six months.  Assuming she tolerates the fluconazole well, she will continue taking it and return to ID Clinic for follow-up with me in about three months, or anytime needed before then.

## 2018-08-02 ENCOUNTER — TELEPHONE (OUTPATIENT)
Dept: INFECTIOUS DISEASES | Facility: CLINIC | Age: 53
End: 2018-08-02

## 2018-08-02 DIAGNOSIS — L27.0 RASH, DRUG: Primary | ICD-10-CM

## 2018-08-02 NOTE — TELEPHONE ENCOUNTER
"Spoke with pt via phone re: symptoms. She reports waking up twice in the past week with nose bleeds. She notices what she thinks are petechiae on her lower legs, but says the pin point \"rash\" is localized to her lower legs only. It does not itch. She also reports that the skin on the end of her L big toe peeled off for no reason. She says she looked these symptoms up online and is concerned that they are reactions to fluconazole. Pt has been on this dose of fluconazole since the end of June 2018 when she saw Dr. Rashid in clinic. She says she has no other symptoms. She noticed the petechiae on her legs earlier this summer, but attributed them to outdoor activities like being in [place], kayaking, etc.    Jammie Shaikh RN    "

## 2018-08-02 NOTE — TELEPHONE ENCOUNTER
Infectious Disease Clinic MD Telephone Note:  I phoned Ms. Kelley back.  She had a normal platelet count on 7/12/18 and her nose bleeds are not unusual for her and have been a long-standing problem over years, so I am not at all convinced that the bilateral (about six inches above the ankle) rash she is seeing are petechiae.  If they are, they and thrombocytopenia would be an unusual side effect of fluconazole.  But in case they are, we will check a CBC with diff and platelets (and INR and LFTs) when she is in a [facility] tomorrow for an appointment with another provider.  She will also have the lower legs rash looked at tomorrow for a viual diagnosis.  I told her that the desquamation on her left first toe could perhaps be due to the azole, but if it is an that is the only location, we do not need to alter her fluconazole dosing at this time.  The toe lesion could also have been due to a contact dermatitis reaction or a bug bite or some such.  She otherwise feels well and lacks other evident problems with fluconazole.  I will contact her if any of the lab tests are abnormal.

## 2018-08-02 NOTE — TELEPHONE ENCOUNTER
[facility] Call Center    Phone Message    May a detailed message be left on voicemail: yes    Reason for Call: Symptoms or Concerns     If patient has red-flag symptoms, warm transfer to triage line    Current symptom or concern: nose bleed, skin peeling, pin point rash on lower legs    Symptoms have been present for:  1 week(s)    Has patient previously been seen for this? No    By  Dr Rashid prescibed medication 6 weeks ago      Are there any new or worsening symptoms? No      Action Taken: Message routed to:  [facility] ([facility]): ID

## 2018-08-03 ENCOUNTER — TRANSFERRED RECORDS (OUTPATIENT)
Dept: HEALTH INFORMATION MANAGEMENT | Facility: CLINIC | Age: 53
End: 2018-08-03

## 2018-09-12 ASSESSMENT — ENCOUNTER SYMPTOMS
EYE IRRITATION: 1
INCREASED ENERGY: 1
NECK MASS: 0
HEADACHES: 1
BACK PAIN: 0
TREMORS: 0
DIZZINESS: 0
ARTHRALGIAS: 1
SORE THROAT: 0
EYE REDNESS: 0
SINUS PAIN: 1
WEAKNESS: 0
JOINT SWELLING: 1
EYE WATERING: 0
NECK PAIN: 0
HOARSE VOICE: 0
HALLUCINATIONS: 0
SEIZURES: 0
SMELL DISTURBANCE: 0
DOUBLE VISION: 0
FEVER: 0
MEMORY LOSS: 0
CHILLS: 0
DECREASED APPETITE: 0
EYE PAIN: 0
TROUBLE SWALLOWING: 0
NUMBNESS: 0
DISTURBANCES IN COORDINATION: 0
NIGHT SWEATS: 0
ALTERED TEMPERATURE REGULATION: 0
MUSCLE WEAKNESS: 0
SINUS CONGESTION: 1
STIFFNESS: 1
TASTE DISTURBANCE: 0
WEIGHT GAIN: 0
FATIGUE: 1
WEIGHT LOSS: 0
TINGLING: 0
LOSS OF CONSCIOUSNESS: 0
POLYDIPSIA: 0
POLYPHAGIA: 0
PARALYSIS: 0
MYALGIAS: 1
SPEECH CHANGE: 0
MUSCLE CRAMPS: 1

## 2018-09-26 ENCOUNTER — OFFICE VISIT (OUTPATIENT)
Dept: INFECTIOUS DISEASES | Facility: CLINIC | Age: 53
End: 2018-09-26
Attending: INTERNAL MEDICINE
Payer: COMMERCIAL

## 2018-09-26 VITALS
HEIGHT: 66 IN | OXYGEN SATURATION: 94 % | SYSTOLIC BLOOD PRESSURE: 125 MMHG | TEMPERATURE: 98.4 F | HEART RATE: 58 BPM | DIASTOLIC BLOOD PRESSURE: 85 MMHG | BODY MASS INDEX: 31.5 KG/M2 | WEIGHT: 196 LBS

## 2018-09-26 DIAGNOSIS — D84.9 IMMUNOSUPPRESSED STATUS (H): ICD-10-CM

## 2018-09-26 DIAGNOSIS — L27.0 RASH, DRUG: ICD-10-CM

## 2018-09-26 DIAGNOSIS — B38.9 COCCIDIOIDOMYCOSIS: Primary | ICD-10-CM

## 2018-09-26 DIAGNOSIS — M06.9 RHEUMATOID ARTHRITIS INVOLVING MULTIPLE SITES, UNSPECIFIED RHEUMATOID FACTOR PRESENCE: ICD-10-CM

## 2018-09-26 LAB
ALBUMIN SERPL-MCNC: 3.8 G/DL (ref 3.4–5)
ALP SERPL-CCNC: 91 U/L (ref 40–150)
ALT SERPL W P-5'-P-CCNC: 33 U/L (ref 0–50)
AST SERPL W P-5'-P-CCNC: 25 U/L (ref 0–45)
BASOPHILS # BLD AUTO: 0 10E9/L (ref 0–0.2)
BASOPHILS NFR BLD AUTO: 0.3 %
BILIRUB DIRECT SERPL-MCNC: 0.1 MG/DL (ref 0–0.2)
BILIRUB SERPL-MCNC: 0.4 MG/DL (ref 0.2–1.3)
DIFFERENTIAL METHOD BLD: NORMAL
EOSINOPHIL # BLD AUTO: 0.1 10E9/L (ref 0–0.7)
EOSINOPHIL NFR BLD AUTO: 1.3 %
ERYTHROCYTE [DISTWIDTH] IN BLOOD BY AUTOMATED COUNT: 11.9 % (ref 10–15)
HCT VFR BLD AUTO: 45.1 % (ref 35–47)
HGB BLD-MCNC: 14.7 G/DL (ref 11.7–15.7)
IMM GRANULOCYTES # BLD: 0 10E9/L (ref 0–0.4)
IMM GRANULOCYTES NFR BLD: 0.2 %
INR PPP: 0.91 (ref 0.86–1.14)
LYMPHOCYTES # BLD AUTO: 2.7 10E9/L (ref 0.8–5.3)
LYMPHOCYTES NFR BLD AUTO: 45 %
MCH RBC QN AUTO: 31.3 PG (ref 26.5–33)
MCHC RBC AUTO-ENTMCNC: 32.6 G/DL (ref 31.5–36.5)
MCV RBC AUTO: 96 FL (ref 78–100)
MONOCYTES # BLD AUTO: 0.4 10E9/L (ref 0–1.3)
MONOCYTES NFR BLD AUTO: 6.8 %
NEUTROPHILS # BLD AUTO: 2.8 10E9/L (ref 1.6–8.3)
NEUTROPHILS NFR BLD AUTO: 46.4 %
NRBC # BLD AUTO: 0 10*3/UL
NRBC BLD AUTO-RTO: 0 /100
PLATELET # BLD AUTO: 188 10E9/L (ref 150–450)
PROT SERPL-MCNC: 8 G/DL (ref 6.8–8.8)
RBC # BLD AUTO: 4.7 10E12/L (ref 3.8–5.2)
WBC # BLD AUTO: 6.1 10E9/L (ref 4–11)

## 2018-09-26 PROCEDURE — 36415 COLL VENOUS BLD VENIPUNCTURE: CPT | Performed by: INTERNAL MEDICINE

## 2018-09-26 PROCEDURE — G0463 HOSPITAL OUTPT CLINIC VISIT: HCPCS | Mod: ZF

## 2018-09-26 PROCEDURE — 85025 COMPLETE CBC W/AUTO DIFF WBC: CPT | Performed by: INTERNAL MEDICINE

## 2018-09-26 PROCEDURE — 85610 PROTHROMBIN TIME: CPT | Performed by: INTERNAL MEDICINE

## 2018-09-26 PROCEDURE — 80076 HEPATIC FUNCTION PANEL: CPT | Performed by: INTERNAL MEDICINE

## 2018-09-26 RX ORDER — FLUCONAZOLE 200 MG/1
400 TABLET ORAL AT BEDTIME
Qty: 60 TABLET | Refills: 11 | Status: SHIPPED | OUTPATIENT
Start: 2018-09-26 | End: 2023-06-13

## 2018-09-26 ASSESSMENT — PAIN SCALES - GENERAL: PAINLEVEL: MODERATE PAIN (4)

## 2018-09-26 NOTE — MR AVS SNAPSHOT
After Visit Summary   9/26/2018    Kady Benson    MRN: [id number]           Patient Information     Date Of Birth          1965        Visit Information        Provider Department      9/26/2018 3:30 PM Errol Rashid MD [facility] Infectious Diseases        Today's Diagnoses     Coccidioidomycosis    -  1    Immunosuppressed status (H)        Rheumatoid arthritis involving multiple sites, unspecified rheumatoid factor presence (H)           Follow-ups after your visit        Follow-up notes from your care team     Return in about 8 months (around 5/26/2019).      Who to contact     If you have questions or need follow up information about today's clinic visit or your schedule please contact [facility] AND INFECTIOUS DISEASES directly at [phone].  Normal or non-critical lab and imaging results will be communicated to you by Clixtrhart, letter or phone within 4 business days after the clinic has received the results. If you do not hear from us within 7 days, please contact the clinic through Clixtrhart or phone. If you have a critical or abnormal lab result, we will notify you by phone as soon as possible.  Submit refill requests through Blink or call your pharmacy and they will forward the refill request to us. Please allow 3 business days for your refill to be completed.          Additional Information About Your Visit        MyChart Information     Blink gives you secure access to your electronic health record. If you see a primary care provider, you can also send messages to your care team and make appointments. If you have questions, please call your primary care clinic.  If you do not have a primary care provider, please call [phone] and they will assist you.        Care EveryWhere ID     This is your Care EveryWhere ID. This could be used by other organizations to access your [facility] medical records  [id number]        Your Vitals Were      "Pulse Temperature Height Pulse Oximetry BMI (Body Mass Index)       58 98.4  F (36.9  C) (Oral) 1.664 m (5' 5.5\") 94% 32.12 kg/m2        Blood Pressure from Last 3 Encounters:   09/26/18 125/85   06/28/18 118/82   06/12/15 125/87    Weight from Last 3 Encounters:   09/26/18 88.9 kg (196 lb)   06/28/18 89.1 kg (196 lb 8 oz)   10/04/16 85.3 kg (188 lb)                 Where to get your medicines      These medications were sent to [facility] - [place], [place] - [facility] [facility]  [address] [address]     Phone:  [phone]     fluconazole 200 MG tablet          Primary Care Provider Office Phone # Fax #    Fran Luna [phone]       [facility] RHEUMATOLOGY [address]  [place] [address]        Equal Access to Services     [facility] HospitalWILMER : Hadii aad ku hadasho Soomaali, waaxda luqadaha, qaybta kaalmada adeegyada, waxay idiin hayeastonn juve [place] . So [facility] [phone].    ATENCIÓN: Si habla español, tiene a mcmillan disposición servicios gratuitos de asistencia lingüística. [facility] [phone].    We comply with applicable federal civil rights laws and [place] laws. We do not discriminate on the basis of race, color, national origin, age, disability, sex, sexual orientation, or gender identity.            Thank you!     Thank you for choosing [facility] AND INFECTIOUS DISEASES  for your care. Our goal is always to provide you with excellent care. Hearing back from our patients is one way we can continue to improve our services. Please take a few minutes to complete the written survey that you may receive in the mail after your visit with us. Thank you!             Your Updated Medication List - Protect others around you: Learn how to safely use, store and throw away your medicines at www.disposemymeds.org.          This list is accurate as of 9/26/18 11:59 PM.  Always use your most recent med list.                   Brand Name " Dispense Instructions for use Diagnosis    ALEVE 220 MG capsule   Generic drug:  naproxen sodium      Take 220 mg by mouth daily as needed (pain)    Sarcoidosis       amoxicillin 500 MG tablet    AMOXIL    4 tablet    Take 2 grams one hour before procedure    S/P knee surgery       CALCIUM PO      Take 1 tablet by mouth daily        fish oil-omega-3 fatty acids 1000 MG capsule      Take 2 g by mouth daily        fluconazole 200 MG tablet    DIFLUCAN    60 tablet    Take 2 tablets (400 mg) by mouth At Bedtime    Coccidioidomycosis, Immunosuppressed status (H)       glucosamine-chondroitin 500-400 MG Caps per capsule      Take 1 capsule by mouth daily Shellfish free        HYPOTEARS PF OP      Apply  to eye as needed.        MAGNESIUM PO      Take 1 tablet by mouth 2 times daily        Milk Thistle 175 MG Tabs      Take by mouth daily        multivitamin per tablet      Take 1 tablet by mouth daily.        ORENCIA CLICKJECT 125 MG/ML Soaj auto-injector   Generic drug:  Abatacept      Take 125 mg by mouth once a week    Coccidioidomycosis, Immunosuppressed status (H)       Turmeric 500 MG Caps      Take by mouth daily        TYLENOL ARTHRITIS PAIN 650 MG CR tablet   Generic drug:  acetaminophen      Take 650 mg by mouth every 8 hours as needed.

## 2018-09-26 NOTE — NURSING NOTE
"Chief Complaint   Patient presents with     RECHECK     3 month f/u coccidioides infection     /85  Pulse 58  Temp 98.4  F (36.9  C) (Oral)  Ht 1.664 m (5' 5.5\")  Wt 88.9 kg (196 lb)  SpO2 94%  BMI 32.12 kg/m2  Ana Menon    "

## 2018-10-01 NOTE — PROGRESS NOTES
"Division of Infectious Diseases and International Medicine  Department of Medicine        INFECTIOUS DISEASE CLINIC OUTPATIENT FOLLOW-UP NOTE [place] Mail Code [address] [place] [address]  Office: [phone]  Fax:  [phone]     HISTORY OF PRESENT ILLNESS:    Ms. Benson is a 53-year-old woman with rheumatoid arthritis on prior long-standing (until 5/29/18) immunosuppression with Humira (followed by Dr. Taylor Lopez of [facility] and Dr. Ravi Luna of [place], [place]) who returns today to Infectious Disease Clinic for follow-up of fluconazole therapy for primary pulmonary coccidioidomycosis.  She lives much of the year in [place], developed a persistent cough in late winter 2018, and was diagnosed with Coccidioides growing on \"culture\" in 5/18.  (See my prior initial 6/28/18 ID Clinic note for details.)  She commenced therapy with low-dose (100 mg daily) fluconazole on 5/31/18 and that dose was increased to 400 mg PO daily (which she takes all at once at bedtime) when she was seen her on 6/28/18.  Her cough has long since resolved and she lacks other pulmonary or overt constitutional symptoms such as fever, but she says that ever since beginning the fluconazole she has felt low-grade persistent \"flu-like\" symptoms of mild fatigue, slight anorexia at times, slight occasional tolerable dizziness, and mild malaise.  These symptoms have not worsened over the past month or so, but also are not fading.  She feels she can live with these side effects, but would rather not if there was a reasonable alternative choice.  She initially had some headaches on the fluconazole, but hose have resolved in more recent weeks.  She remains on Orencia injections for her rheumatoid arthritis (since 6/22/18).  She has been living for the summer in [place], [place], but is moving back to [place] for the winter in about two weeks.  Beyond the mild afebrile " constitutional symptoms, she otherwise feels status quo and lacks recent other new complaints, including no chills, night sweats, rash, myalgias/arthralgias, EENT symptoms, dyspnea, chest pain, nausea, abdominal pain, diarrhea, genitourinary symptoms, headache or other neurological symptoms.    PAST MEDICAL HISTORY:  Past sinusitis  History of sarcoidosis in asymptomatic remission for a number of years.  Past Medical History:   Diagnosis Date     Angle recession glaucoma of left eye      Arthropathy     multiple sites     Depression      Glaucoma suspect      RA (rheumatoid arthritis) (H) 1/4/2012     Past Surgical History:   Procedure Laterality Date     APPENDECTOMY       ARTHROPLASTY KNEE  11/29/2012    Procedure: ARTHROPLASTY KNEE;  Right Total Knee Arthroplasty,  Left Knee Steroid Injection   ;  Surgeon: Florence Billings MD;  [facility]     ARTHROSCOPY ANKLE  3/26/2014    Procedure: ARTHROSCOPY ANKLE;;  Surgeon: Christopher Peres MD;  [facility]     ARTHROTOMY FOOT  3/26/2014    Procedure: ARTHROTOMY FOOT;  Right 1st Metatarsal Phalangeal Joint Debridement, Left Ankle Arthroscopy & Debridement, Left 2nd And 3rd Metatarsal Shortening, Left 2nd And 3rd Hammer Toe Correction Surgery   ;  Surgeon: Christopher Peres MD;  [facility]     GYN SURGERY       INJECT STEROID (LOCATION)  11/29/2012    Procedure: INJECT STEROID (LOCATION);;  Surgeon: Florence Billings MD;  [facility]     MEDIASTINOSCOPY N/A 8/20/2014    Procedure: MEDIASTINOSCOPY;  Surgeon: Christiano Hernandez MD;  [facility]     ORTHOPEDIC SURGERY       REPAIR HAMMER TOE  3/26/2014    Procedure: REPAIR HAMMER TOE;;  Surgeon: Christopher Peres MD;  [facility]     ALLERGIES:  Allergies   Allergen Reactions     Shellfish Allergy Anaphylaxis     Iodine-131 Other (See Comments)     Sulfasalazine Rash     Vancomycin Rash     Wellbutrin [Bupropion] Rash     MEDICATIONS:  Current Outpatient Prescriptions    Medication Sig Dispense Refill     acetaminophen (TYLENOL ARTHRITIS PAIN) 650 MG CR tablet Take 650 mg by mouth every 8 hours as needed.       Artificial Tear Solution (HYPOTEARS PF OP) Apply  to eye as needed.       CALCIUM PO Take 1 tablet by mouth daily       fish oil-omega-3 fatty acids 1000 MG capsule Take 2 g by mouth daily       fluconazole (DIFLUCAN) 200 MG tablet Take 2 tablets (400 mg) by mouth At Bedtime 60 tablet 11     glucosamine-chondroitin 500-400 MG CAPS Take 1 capsule by mouth daily Shellfish free       MAGNESIUM PO Take 1 tablet by mouth 2 times daily        Milk Thistle 175 MG TABS Take by mouth daily       Multiple Vitamin (MULTIVITAMIN) per tablet Take 1 tablet by mouth daily.       ORENCIA CLICKJECT 125 MG/ML SOAJ auto-injector Take 125 mg by mouth once a week        Turmeric 500 MG CAPS Take by mouth daily        amoxicillin (AMOXIL) 500 MG tablet Take 2 grams one hour before procedure 4 tablet 2     naproxen sodium (ALEVE) 220 MG capsule Take 220 mg by mouth daily as needed (pain)        SOCIAL HISTORY:  Social History     Social History     Marital status: Single     Spouse name: N/A     Number of children: N/A     Years of education: N/A     Occupational History     Not on file.     Social History Main Topics     Smoking status: Never Smoker     Smokeless tobacco: Never Used     Alcohol use Yes      Comment: 1 a day     Drug use: No     Sexual activity: Not on file     Other Topics Concern     Not on file     Social History Narrative   Retired clinic manager and lymphoma researcher.  She is considering starting work again on a new trial of a CAR T-cell therapy.  Previously accompanied to clinic by her spouse, Aleena.  Lives in [place] during the bender and [facility] and the [facility] / [place], [place], during the summers.    FAMILY HISTORY:  Family History   Problem Relation Age of Onset     Uterine Cancer Mother      Skin Cancer Father      HEART DISEASE Maternal Grandmother      HEART DISEASE Paternal  "Grandmother      Prostate Cancer Paternal Grandfather      Stomach Cancer Paternal Grandfather      REVIEW OF SYSTEMS:  As per HPI.  Complete ROS is otherwise negative.    PHYSICAL EXAMINATION:  General:  A pleasant, conversant, WDWN, 53-year-old woman in NAD.  Vital signs:  /85  Pulse 58  Temp 98.4  F (36.9  C) (Oral)  Ht 1.664 m (5' 5.5\")  Wt 88.9 kg (196 lb)  SpO2 94%  BMI 32.12 kg/m2 (weight is stable).  Skin:  No new rash or lesion.  Head/Neck:  NCAT.  External auditory canals are patent without otorrhea.  PERRL.  EOMI.  Conjunctivae are pink and uninjected.  Sclerae are anicteric.  There is no anterior oral lesion.  Neck is supple without visible thyromegaly or lymphadenopathy.  Chest:  Bilaterally clear to auscultation.  CV:  RRR.  Normal S1, S2, without gallop, murmur, or rub.  Abdomen:  Bowel sounds normal, soft, obese, nontender, no hepatomegaly by percussion.  Back:  No low back or CVA tenderness.  Extremities:  Distally warm, no edema.  Neuro:  Alert, oriented, memory/cognition/affect are normal.  Gait is normal.    LABORATORY STUDIES (Past results reviewed with the patient during her appointment today):      WBC 09/26/2018 6.1  4.0 - 11.0 10e9/L Final     RBC Count 09/26/2018 4.70  3.8 - 5.2 10e12/L Final     Hemoglobin 09/26/2018 14.7  11.7 - 15.7 g/dL Final     Hematocrit 09/26/2018 45.1  35.0 - 47.0 % Final     MCV 09/26/2018 96  78 - 100 fl Final     MCH 09/26/2018 31.3  26.5 - 33.0 pg Final     MCHC 09/26/2018 32.6  31.5 - 36.5 g/dL Final     RDW 09/26/2018 11.9  10.0 - 15.0 % Final     Platelet Count 09/26/2018 188  150 - 450 10e9/L Final     Diff Method 09/26/2018 Automated Method   Final     % Neutrophils 09/26/2018 46.4  % Final     % Lymphocytes 09/26/2018 45.0  % Final     % Monocytes 09/26/2018 6.8  % Final     % Eosinophils 09/26/2018 1.3  % Final     % Basophils 09/26/2018 0.3  % Final     % Immature Granulocytes 09/26/2018 0.2  % Final     Nucleated RBCs 09/26/2018 0  0 /100 " Final     Absolute Neutrophil 09/26/2018 2.8  1.6 - 8.3 10e9/L Final     Absolute Lymphocytes 09/26/2018 2.7  0.8 - 5.3 10e9/L Final     Absolute Monocytes 09/26/2018 0.4  0.0 - 1.3 10e9/L Final     Absolute Eosinophils 09/26/2018 0.1  0.0 - 0.7 10e9/L Final     Absolute Basophils 09/26/2018 0.0  0.0 - 0.2 10e9/L Final     Abs Immature Granulocytes 09/26/2018 0.0  0 - 0.4 10e9/L Final     Absolute Nucleated RBC 09/26/2018 0.0   Final     Bilirubin Direct 09/26/2018 0.1  0.0 - 0.2 mg/dL Final     Bilirubin Total 09/26/2018 0.4  0.2 - 1.3 mg/dL Final     Albumin 09/26/2018 3.8  3.4 - 5.0 g/dL Final     Protein Total 09/26/2018 8.0  6.8 - 8.8 g/dL Final     Alkaline Phosphatase 09/26/2018 91  40 - 150 U/L Final     ALT 09/26/2018 33  0 - 50 U/L Final     AST 09/26/2018 25  0 - 45 U/L Final     INR 09/26/2018 0.91  0.86 - 1.14 Final     Microbiology / Mycology assays from outside [facility] are not available.    IMPRESSION/PLAN:  A 53 year old woman on long-standing (until 5/29/18) immunosuppression with Humira for rheumatoid arthritis who lives much of the year in [place] and was diagnosed with primary pulmonary coccidioidomycosis in 5/18.  She returns to Infectious Disease Clinic today for follow-up of ongoing antifungal therapy with fluconazole 400 mg PO at bedtime which she has taken since 6/28/18 (preceeded by a lower dose of fluconazole starting 5/31/18).  She takes the fluconazole very consistently.  She tolerates it barely adequately, disliking low-grade afebrile flu-like symptoms which she attributes to the antifungal because they have been present since she started it.  The fatigue, slight malaise, and slight occasional transient dizziness are tolerable.  We discussed that other options for treating coccidioidomycosis would be other azole antifungals (which very well might induce the same presumed side effects) or liposomal amphotericin (which she is quite familiar with as a lymphoma researcher and is not interested  "in trying).  She believes she can continue to tolerate the fluconazole and chooses to remain on it for the remainder of a standard one year's course of therapy.  She takes the entire daily dose at bedtime by choice because that mostly eliminates the dizziness and reduces the other mild constitutional fluconazole side effects.  She remains on Orencia injections since 6/18 for her rheumatoid arthritis.  We will repeat antifungal drug monitoring laboratory studies (CBC, CMP) today, as well as obtaining a single check of her INR (to make certain it is normal), and will repeat the monitoring studies again in about four to six months (in [place], as part of her care there), if feasible.  We discussed that after ~ six months of initial (induction) fluconazole therapy at 400 mg PO daily, it would be reasonable (on ~ 1/1/19) to decrease the daily dose to 200 mg PO daily for an additional six months (as maintenance therapy).  She will do so if she wishes at that time.  She will return to this clinic for follow-up in about eight months (about a month before she completes a year's course of fluconazole), in early summer 2019.  We will obtain a \"new baseline\" chest x-ray at that appointment.  She is soon leaving [place] for the winter in [place] on about 10/1/18, but is invited to contact me by phone or MyChart if her presumed-fluconazole-induced \"flu-like\" side effects worsen or if any additional questions or concerns arise before her next appointment here.  "

## 2019-03-12 ENCOUNTER — TRANSFERRED RECORDS (OUTPATIENT)
Dept: HEALTH INFORMATION MANAGEMENT | Facility: CLINIC | Age: 54
End: 2019-03-12

## 2019-03-27 ENCOUNTER — TELEPHONE (OUTPATIENT)
Dept: INFECTIOUS DISEASES | Facility: CLINIC | Age: 54
End: 2019-03-27

## 2019-04-30 ENCOUNTER — TELEPHONE (OUTPATIENT)
Dept: INFECTIOUS DISEASES | Facility: CLINIC | Age: 54
End: 2019-04-30

## 2019-04-30 NOTE — TELEPHONE ENCOUNTER
Patient contacted and reminded of upcoming appointment.  Patient confirmed they will be attending.  Patient instructed to bring updated medications list to appointment.    tzimmer cma

## 2019-05-01 ENCOUNTER — OFFICE VISIT (OUTPATIENT)
Dept: INFECTIOUS DISEASES | Facility: CLINIC | Age: 54
End: 2019-05-01
Attending: INTERNAL MEDICINE
Payer: COMMERCIAL

## 2019-05-01 VITALS
TEMPERATURE: 98.3 F | HEART RATE: 69 BPM | SYSTOLIC BLOOD PRESSURE: 134 MMHG | DIASTOLIC BLOOD PRESSURE: 87 MMHG | HEIGHT: 66 IN | WEIGHT: 202 LBS | BODY MASS INDEX: 32.47 KG/M2

## 2019-05-01 DIAGNOSIS — Z86.19 HISTORY OF COCCIDIOIDOMYCOSIS: Primary | ICD-10-CM

## 2019-05-01 PROCEDURE — G0463 HOSPITAL OUTPT CLINIC VISIT: HCPCS | Mod: ZF

## 2019-05-01 ASSESSMENT — ENCOUNTER SYMPTOMS
TASTE DISTURBANCE: 0
NECK MASS: 0
TROUBLE SWALLOWING: 0
SMELL DISTURBANCE: 0
SINUS PAIN: 1
SORE THROAT: 0
HOARSE VOICE: 0
SINUS CONGESTION: 1

## 2019-05-01 ASSESSMENT — PAIN SCALES - GENERAL: PAINLEVEL: MILD PAIN (3)

## 2019-05-01 ASSESSMENT — MIFFLIN-ST. JEOR: SCORE: 1528.02

## 2019-05-01 NOTE — LETTER
"5/1/2019      RE: Kady Benson  [address] [facility] [address]       Division of Infectious Diseases and International Medicine  Department of Medicine        INFECTIOUS DISEASE CLINIC OUTPATIENT FOLLOW-UP NOTE [place] Mail Code [address] [place] [address]  Office: [phone]  Fax:  [phone]     HISTORY OF PRESENT ILLNESS:    Kady Benson is a very pleasant 53-year-old woman with rheumatoid arthritis on prior long-standing (until 5/29/18) immunosuppression with previously-Humira / now Orencia (since 6/22/18), followed by Dr. Taylor Lopez of [facility] and Dr. Ravi Luna of [place], [place]).  She returns Infectious Disease Clinic today to follow-up her prior primary pulmonary coccidioidomycosis infection (residing much of the year in [place] with a persistent cough in late winter 2018 and Coccidioides growing on \"culture\" in 5/18) for which she received fluconazole 400 mg PO daily at bedtime starting on 6/28/18.  When last seen in this clinic on 9/26/18, the recommendation was to continue that dose of fluconazole to complete a six month course through 1/1/19 or so, and then to consider taking a decreased dose of 200 mg PO daily for an additional six months of follow-on \"maintenance\" therapy.  She tells me today, however, that she discontinued the fluconazole altogether in 1/19 and decided to dispense with the later lower dose maintenance.  So, returns to clinic today off fluconazole for about four months.  She reports feeling well, with good appetite and energy, normal exercise endurance for her (she can bike for 45 minutes on a [employer] machine without difficulty), and no fever, chills, night sweats, cough, dyspnea, chest discomfort, significant headache, or other symptoms of illness in recent months.  She has still been on Orencia through Dr. Luna for more than a year now.  She last saw him in clinic on 3/12/19 at " "which time he felt she was doing well and obtained repeat labs including CRP, ESR and CBC which were normal (results provided to me and scanned into Epic).  She is pleased to be off the fluconazole and her prior \"flu-like\" side effects from that resolved.  She lacks additional complaints today.    PAST MEDICAL HISTORY:  Past sinusitis  History of sarcoidosis in asymptomatic remission for a number of years.  Past Medical History:   Diagnosis Date     Angle recession glaucoma of left eye      Arthropathy     multiple sites     Depression      Glaucoma suspect      RA (rheumatoid arthritis) (H) 1/4/2012     Past Surgical History:   Procedure Laterality Date     APPENDECTOMY       ARTHROPLASTY KNEE  11/29/2012    Procedure: ARTHROPLASTY KNEE;  Right Total Knee Arthroplasty,  Left Knee Steroid Injection   ;  Surgeon: Florence Billings MD;  [facility]     ARTHROSCOPY ANKLE  3/26/2014    Procedure: ARTHROSCOPY ANKLE;;  Surgeon: Christopher Peres MD;  [facility]     ARTHROTOMY FOOT  3/26/2014    Procedure: ARTHROTOMY FOOT;  Right 1st Metatarsal Phalangeal Joint Debridement, Left Ankle Arthroscopy & Debridement, Left 2nd And 3rd Metatarsal Shortening, Left 2nd And 3rd Hammer Toe Correction Surgery   ;  Surgeon: Christopher Peres MD;  [facility]     GYN SURGERY       INJECT STEROID (LOCATION)  11/29/2012    Procedure: INJECT STEROID (LOCATION);;  Surgeon: Florence Billings MD;  [facility]     MEDIASTINOSCOPY N/A 8/20/2014    Procedure: MEDIASTINOSCOPY;  Surgeon: Christiano Hernandez MD;  [facility]     ORTHOPEDIC SURGERY       REPAIR HAMMER TOE  3/26/2014    Procedure: REPAIR HAMMER TOE;;  Surgeon: Christopher Peres MD;  [facility]     ALLERGIES:  Allergies   Allergen Reactions     Shellfish Allergy Anaphylaxis     Iodine-131 Other (See Comments)     Sulfasalazine Rash     Vancomycin Rash     Wellbutrin [Bupropion] Rash     MEDICATIONS:  Fluconazole discontinued " entirely in 1/19 by patient.  Current Outpatient Medications   Medication Sig Dispense Refill     acetaminophen (TYLENOL ARTHRITIS PAIN) 650 MG CR tablet Take 650 mg by mouth every 8 hours as needed.       Artificial Tear Solution (HYPOTEARS PF OP) Apply  to eye as needed.       CALCIUM PO Take 1 tablet by mouth daily       fish oil-omega-3 fatty acids 1000 MG capsule Take 2 g by mouth daily       glucosamine-chondroitin 500-400 MG CAPS Take 1 capsule by mouth daily Shellfish free       MAGNESIUM PO Take 1 tablet by mouth 2 times daily        Milk Thistle 175 MG TABS Take by mouth daily       Multiple Vitamin (MULTIVITAMIN) per tablet Take 1 tablet by mouth daily.       naproxen sodium (ALEVE) 220 MG capsule Take 220 mg by mouth daily as needed (pain)        ORENCIA CLICKJECT 125 MG/ML SOAJ auto-injector Take 125 mg by mouth once a week        Turmeric 500 MG CAPS Take by mouth daily        amoxicillin (AMOXIL) 500 MG tablet Take 2 grams one hour before procedure (Patient not taking: Reported on 5/1/2019) 4 tablet 2     fluconazole (DIFLUCAN) 200 MG tablet Take 2 tablets (400 mg) by mouth At Bedtime (Patient not taking: Reported on 5/1/2019) 60 tablet 11     SOCIAL HISTORY:  Social History     Socioeconomic History     Marital status:      Spouse name: Not on file     Number of children: Not on file     Years of education: Not on file     Highest education level: Not on file   Occupational History     Not on file   Social Needs     Financial resource strain: Not on file     Food insecurity:     Worry: Not on file     Inability: Not on file     Transportation needs:     Medical: Not on file     Non-medical: Not on file   Tobacco Use     Smoking status: Never Smoker     Smokeless tobacco: Never Used   Substance and Sexual Activity     Alcohol use: Yes     Comment: 1 a day     Drug use: No     Sexual activity: Not on file   Lifestyle     Physical activity:     Days per week: Not on file     Minutes per session:  "Not on file     Stress: Not on file   Relationships     Social connections:     Talks on phone: Not on file     Gets together: Not on file     Attends Mosque service: Not on file     Active member of club or organization: Not on file     Attends meetings of clubs or organizations: Not on file     Relationship status: Not on file     Intimate partner violence:     Fear of current or ex partner: Not on file     Emotionally abused: Not on file     Physically abused: Not on file     Forced sexual activity: Not on file   Other Topics Concern     Not on file   Social History Narrative     Not on file   Retired clinic manager and lymphoma researcher.  She is considering starting work again on a new trial of a CAR T-cell therapy.  Previously accompanied to clinic by her spouse, Aleena.  Lives in [place] during the bender and [place], [place], during the summers.    FAMILY HISTORY:  Family History   Problem Relation Age of Onset     Uterine Cancer Mother      Skin Cancer Father      Heart Disease Maternal Grandmother      Heart Disease Paternal Grandmother      Prostate Cancer Paternal Grandfather      Stomach Cancer Paternal Grandfather      REVIEW OF SYSTEMS:  As per HPI.  Complete ROS is otherwise negative.    PHYSICAL EXAMINATION:  General:  A personable, articulate, WDWN, 53-year-old woman in NAD.  Vital signs:  /87   Pulse 69   Temp 98.3  F (36.8  C) (Oral)   Ht 1.676 m (5' 6\")   Wt 91.6 kg (202 lb)   BMI 32.60 kg/m    (weight is ~ stable).  Skin:  No acute rash or lesion.  Head/Neck:  NCAT.  External auditory canals are patent bilaterally without discharge.  PERRL.  EOMI.  Conjunctivae are pink and lack injection.  Anicteric sclerae.  There is no anterior oral lesion.  Neck is supple without visible changes.  Lungs:  Bilaterally clear to auscultation.  CV:  RRR.  Normal S1, S2, without gallop, murmur, or rub.  Abdomen:  Bowel sounds normal, soft.  Extremities:  Distally warm, no edema.  Neuro:  " Alert, oriented, memory/cognition/affect are normal.  Gait is normal.    LABORATORY STUDIES (Reviewed with the patient during her appointment today):    Results from [facility], 3/12/19:  WBC 5.2, hemoglobin 15.3, platelets 204,000, ANC 2.3, ALC 2.4, creatinine 0.86, AST 32, ALT 24, albumin 4.9 ESR 3, CRP 3.2 (normal 0 -5).      WBC 09/26/2018 6.1  4.0 - 11.0 10e9/L Final     RBC Count 09/26/2018 4.70  3.8 - 5.2 10e12/L Final     Hemoglobin 09/26/2018 14.7  11.7 - 15.7 g/dL Final     Hematocrit 09/26/2018 45.1  35.0 - 47.0 % Final     MCV 09/26/2018 96  78 - 100 fl Final     MCH 09/26/2018 31.3  26.5 - 33.0 pg Final     MCHC 09/26/2018 32.6  31.5 - 36.5 g/dL Final     RDW 09/26/2018 11.9  10.0 - 15.0 % Final     Platelet Count 09/26/2018 188  150 - 450 10e9/L Final     Diff Method 09/26/2018 Automated Method   Final     % Neutrophils 09/26/2018 46.4  % Final     % Lymphocytes 09/26/2018 45.0  % Final     % Monocytes 09/26/2018 6.8  % Final     % Eosinophils 09/26/2018 1.3  % Final     % Basophils 09/26/2018 0.3  % Final     % Immature Granulocytes 09/26/2018 0.2  % Final     Nucleated RBCs 09/26/2018 0  0 /100 Final     Absolute Neutrophil 09/26/2018 2.8  1.6 - 8.3 10e9/L Final     Absolute Lymphocytes 09/26/2018 2.7  0.8 - 5.3 10e9/L Final     Absolute Monocytes 09/26/2018 0.4  0.0 - 1.3 10e9/L Final     Absolute Eosinophils 09/26/2018 0.1  0.0 - 0.7 10e9/L Final     Absolute Basophils 09/26/2018 0.0  0.0 - 0.2 10e9/L Final     Abs Immature Granulocytes 09/26/2018 0.0  0 - 0.4 10e9/L Final     Absolute Nucleated RBC 09/26/2018 0.0   Final     Bilirubin Direct 09/26/2018 0.1  0.0 - 0.2 mg/dL Final     Bilirubin Total 09/26/2018 0.4  0.2 - 1.3 mg/dL Final     Albumin 09/26/2018 3.8  3.4 - 5.0 g/dL Final     Protein Total 09/26/2018 8.0  6.8 - 8.8 g/dL Final     Alkaline Phosphatase 09/26/2018 91  40 - 150 U/L Final     ALT 09/26/2018 33  0 - 50 U/L Final     AST 09/26/2018 25  0 - 45 U/L Final     INR  09/26/2018 0.91  0.86 - 1.14 Final     IMPRESSION/PLAN:  A 53 year old woman on long-standing (until 5/29/18) immunosuppression with previously-Humira / now Orencia (since 6/22/18) for rheumatoid arthritis who lives much of the year in [place] and was diagnosed with primary pulmonary coccidioidomycosis in 5/18.  She returns to Infectious Disease Clinic today for follow-up of prior antifungal therapy with fluconazole 400 mg PO at bedtime which took from 6/28/18 (preceeded by a lower dose of fluconazole starting 5/31/18) until early 1/19.  She did not tolerate fluconazole perfectly well and wanted to limit the course to the basic duration necessary.  She has now been entirely off antifungal therapy since 1/19 and feels quite well without any constutitional or pulmonary symptoms.  At this point, I agree that she has completed a reasonable course of antifungal therapy.  The 3/12/19 laboratory studies including normal inflammatory markers were also reassuring.  We will repeat a chest x-ray today (or within the next couple of weeks at her convenience) but, assuming that is unremarkable as expected, she needs no further ID interventions or therapy.  She does not need any future appointment here scheduled at this time.  She says she will contact me or her primary care or rheumatology physician if she develops new symptoms or other ID-related concerns in the future     Errol Rashid MD

## 2019-05-01 NOTE — NURSING NOTE
"/87   Pulse 69   Temp 98.3  F (36.8  C) (Oral)   Ht 1.676 m (5' 6\")   Wt 91.6 kg (202 lb)   BMI 32.60 kg/m    Chief Complaint   Patient presents with     RECHECK     follow up with coccidioides infection, janey kimble       "

## 2019-05-02 NOTE — PROGRESS NOTES
"Division of Infectious Diseases and International Medicine  Department of Medicine        INFECTIOUS DISEASE CLINIC OUTPATIENT FOLLOW-UP NOTE [place] Mail [phone] [place] [address]  Office: [phone]  Fax:  [phone]     HISTORY OF PRESENT ILLNESS:    Kady Benson is a very pleasant 53-year-old woman with rheumatoid arthritis on prior long-standing (until 5/29/18) immunosuppression with previously-Humira / now Orencia (since 6/22/18), followed by Dr. Taylor Lopez of [facility] and Dr. Ravi Luna of [facility]).  She returns Infectious Disease Clinic today to follow-up her prior primary pulmonary coccidioidomycosis infection (residing much of the year in [place] with a persistent cough in late winter 2018 and Coccidioides growing on \"culture\" in 5/18) for which she received fluconazole 400 mg PO daily at bedtime starting on 6/28/18.  When last seen in this clinic on 9/26/18, the recommendation was to continue that dose of fluconazole to complete a six month course through 1/1/19 or so, and then to consider taking a decreased dose of 200 mg PO daily for an additional six months of follow-on \"maintenance\" therapy.  She tells me today, however, that she discontinued the fluconazole altogether in 1/19 and decided to dispense with the later lower dose maintenance.  So, returns to clinic today off fluconazole for about four months.  She reports feeling well, with good appetite and energy, normal exercise endurance for her (she can bike for 45 minutes on a Peliton machine without difficulty), and no fever, chills, night sweats, cough, dyspnea, chest discomfort, significant headache, or other symptoms of illness in recent months.  She has still been on Orencia through Dr. Luna for more than a year now.  She last saw him in clinic on 3/12/19 at which time he felt she was doing well and obtained repeat labs including CRP, ESR and " "CBC which were normal (results provided to me and scanned into Epic).  She is pleased to be off the fluconazole and her prior \"flu-like\" side effects from that resolved.  She lacks additional complaints today.    PAST MEDICAL HISTORY:  Past sinusitis  History of sarcoidosis in asymptomatic remission for a number of years.  Past Medical History:   Diagnosis Date     Angle recession glaucoma of left eye      Arthropathy     multiple sites     Depression      Glaucoma suspect      RA (rheumatoid arthritis) (H) 1/4/2012     Past Surgical History:   Procedure Laterality Date     APPENDECTOMY       ARTHROPLASTY KNEE  11/29/2012    Procedure: ARTHROPLASTY KNEE;  Right Total Knee Arthroplasty,  Left Knee Steroid Injection   ;  Surgeon: Florence Billings MD;  [facility]     ARTHROSCOPY ANKLE  3/26/2014    Procedure: ARTHROSCOPY ANKLE;;  Surgeon: Christopher Peres MD;  [facility]     ARTHROTOMY FOOT  3/26/2014    Procedure: ARTHROTOMY FOOT;  Right 1st Metatarsal Phalangeal Joint Debridement, Left Ankle Arthroscopy & Debridement, Left 2nd And 3rd Metatarsal Shortening, Left 2nd And 3rd Hammer Toe Correction Surgery   ;  Surgeon: Christopher Peres MD;  Location[facility]     GYN SURGERY       INJECT STEROID (LOCATION)  11/29/2012    Procedure: INJECT STEROID (LOCATION);;  Surgeon: Florence Billings MD;  [facility]     MEDIASTINOSCOPY N/A 8/20/2014    Procedure: MEDIASTINOSCOPY;  Surgeon: Christiano Hernandez MD;  [facility]     ORTHOPEDIC SURGERY       REPAIR HAMMER TOE  3/26/2014    Procedure: REPAIR HAMMER TOE;;  Surgeon: Christopher Peres MD;  [facility]     ALLERGIES:  Allergies   Allergen Reactions     Shellfish Allergy Anaphylaxis     Iodine-131 Other (See Comments)     Sulfasalazine Rash     Vancomycin Rash     Wellbutrin [Bupropion] Rash     MEDICATIONS:  Fluconazole discontinued entirely in 1/19 by patient.  Current Outpatient Medications   Medication Sig Dispense " Refill     acetaminophen (TYLENOL ARTHRITIS PAIN) 650 MG CR tablet Take 650 mg by mouth every 8 hours as needed.       Artificial Tear Solution (HYPOTEARS PF OP) Apply  to eye as needed.       CALCIUM PO Take 1 tablet by mouth daily       fish oil-omega-3 fatty acids 1000 MG capsule Take 2 g by mouth daily       glucosamine-chondroitin 500-400 MG CAPS Take 1 capsule by mouth daily Shellfish free       MAGNESIUM PO Take 1 tablet by mouth 2 times daily        Milk Thistle 175 MG TABS Take by mouth daily       Multiple Vitamin (MULTIVITAMIN) per tablet Take 1 tablet by mouth daily.       naproxen sodium (ALEVE) 220 MG capsule Take 220 mg by mouth daily as needed (pain)        ORENCIA CLICKJECT 125 MG/ML SOAJ auto-injector Take 125 mg by mouth once a week        Turmeric 500 MG CAPS Take by mouth daily        amoxicillin (AMOXIL) 500 MG tablet Take 2 grams one hour before procedure (Patient not taking: Reported on 5/1/2019) 4 tablet 2     fluconazole (DIFLUCAN) 200 MG tablet Take 2 tablets (400 mg) by mouth At Bedtime (Patient not taking: Reported on 5/1/2019) 60 tablet 11     SOCIAL HISTORY:  Social History     Socioeconomic History     Marital status:      Spouse name: Not on file     Number of children: Not on file     Years of education: Not on file     Highest education level: Not on file   Occupational History     Not on file   Social Needs     Financial resource strain: Not on file     Food insecurity:     Worry: Not on file     Inability: Not on file     Transportation needs:     Medical: Not on file     Non-medical: Not on file   Tobacco Use     Smoking status: Never Smoker     Smokeless tobacco: Never Used   Substance and Sexual Activity     Alcohol use: Yes     Comment: 1 a day     Drug use: No     Sexual activity: Not on file   Lifestyle     Physical activity:     Days per week: Not on file     Minutes per session: Not on file     Stress: Not on file   Relationships     Social connections:     Talks  "on phone: Not on file     Gets together: Not on file     Attends Sikhism service: Not on file     Active member of club or organization: Not on file     Attends meetings of clubs or organizations: Not on file     Relationship status: Not on file     Intimate partner violence:     Fear of current or ex partner: Not on file     Emotionally abused: Not on file     Physically abused: Not on file     Forced sexual activity: Not on file   Other Topics Concern     Not on file   Social History Narrative     Not on file   Retired clinic manager and lymphoma researcher.  She is considering starting work again on a new trial of a CAR T-cell therapy.  Previously accompanied to clinic by her spouse, Aleena.  Lives in [place] during the bender and [place], [place], during the summers.    FAMILY HISTORY:  Family History   Problem Relation Age of Onset     Uterine Cancer Mother      Skin Cancer Father      Heart Disease Maternal Grandmother      Heart Disease Paternal Grandmother      Prostate Cancer Paternal Grandfather      Stomach Cancer Paternal Grandfather      REVIEW OF SYSTEMS:  As per HPI.  Complete ROS is otherwise negative.    PHYSICAL EXAMINATION:  General:  A personable, articulate, WDWN, 53-year-old woman in NAD.  Vital signs:  /87   Pulse 69   Temp 98.3  F (36.8  C) (Oral)   Ht 1.676 m (5' 6\")   Wt 91.6 kg (202 lb)   BMI 32.60 kg/m   (weight is ~ stable).  Skin:  No acute rash or lesion.  Head/Neck:  NCAT.  External auditory canals are patent bilaterally without discharge.  PERRL.  EOMI.  Conjunctivae are pink and lack injection.  Anicteric sclerae.  There is no anterior oral lesion.  Neck is supple without visible changes.  Lungs:  Bilaterally clear to auscultation.  CV:  RRR.  Normal S1, S2, without gallop, murmur, or rub.  Abdomen:  Bowel sounds normal, soft.  Extremities:  Distally warm, no edema.  Neuro:  Alert, oriented, memory/cognition/affect are normal.  Gait is normal.    LABORATORY " STUDIES (Reviewed with the patient during her appointment today):    Results from [facility], 3/12/19:  WBC 5.2, hemoglobin 15.3, platelets 204,000, ANC 2.3, ALC 2.4, creatinine 0.86, AST 32, ALT 24, albumin 4.9 ESR 3, CRP 3.2 (normal 0 -5).      WBC 09/26/2018 6.1  4.0 - 11.0 10e9/L Final     RBC Count 09/26/2018 4.70  3.8 - 5.2 10e12/L Final     Hemoglobin 09/26/2018 14.7  11.7 - 15.7 g/dL Final     Hematocrit 09/26/2018 45.1  35.0 - 47.0 % Final     MCV 09/26/2018 96  78 - 100 fl Final     MCH 09/26/2018 31.3  26.5 - 33.0 pg Final     MCHC 09/26/2018 32.6  31.5 - 36.5 g/dL Final     RDW 09/26/2018 11.9  10.0 - 15.0 % Final     Platelet Count 09/26/2018 188  150 - 450 10e9/L Final     Diff Method 09/26/2018 Automated Method   Final     % Neutrophils 09/26/2018 46.4  % Final     % Lymphocytes 09/26/2018 45.0  % Final     % Monocytes 09/26/2018 6.8  % Final     % Eosinophils 09/26/2018 1.3  % Final     % Basophils 09/26/2018 0.3  % Final     % Immature Granulocytes 09/26/2018 0.2  % Final     Nucleated RBCs 09/26/2018 0  0 /100 Final     Absolute Neutrophil 09/26/2018 2.8  1.6 - 8.3 10e9/L Final     Absolute Lymphocytes 09/26/2018 2.7  0.8 - 5.3 10e9/L Final     Absolute Monocytes 09/26/2018 0.4  0.0 - 1.3 10e9/L Final     Absolute Eosinophils 09/26/2018 0.1  0.0 - 0.7 10e9/L Final     Absolute Basophils 09/26/2018 0.0  0.0 - 0.2 10e9/L Final     Abs Immature Granulocytes 09/26/2018 0.0  0 - 0.4 10e9/L Final     Absolute Nucleated RBC 09/26/2018 0.0   Final     Bilirubin Direct 09/26/2018 0.1  0.0 - 0.2 mg/dL Final     Bilirubin Total 09/26/2018 0.4  0.2 - 1.3 mg/dL Final     Albumin 09/26/2018 3.8  3.4 - 5.0 g/dL Final     Protein Total 09/26/2018 8.0  6.8 - 8.8 g/dL Final     Alkaline Phosphatase 09/26/2018 91  40 - 150 U/L Final     ALT 09/26/2018 33  0 - 50 U/L Final     AST 09/26/2018 25  0 - 45 U/L Final     INR 09/26/2018 0.91  0.86 - 1.14 Final     IMPRESSION/PLAN:  A 53 year old woman on  long-standing (until 5/29/18) immunosuppression with previously-Humira / now Orencia (since 6/22/18) for rheumatoid arthritis who lives much of the year in [place] and was diagnosed with primary pulmonary coccidioidomycosis in 5/18.  She returns to Infectious Disease Clinic today for follow-up of prior antifungal therapy with fluconazole 400 mg PO at bedtime which took from 6/28/18 (preceeded by a lower dose of fluconazole starting 5/31/18) until early 1/19.  She did not tolerate fluconazole perfectly well and wanted to limit the course to the basic duration necessary.  She has now been entirely off antifungal therapy since 1/19 and feels quite well without any constutitional or pulmonary symptoms.  At this point, I agree that she has completed a reasonable course of antifungal therapy.  The 3/12/19 laboratory studies including normal inflammatory markers were also reassuring.  We will repeat a chest x-ray today (or within the next couple of weeks at her convenience) but, assuming that is unremarkable as expected, she needs no further ID interventions or therapy.  She does not need any future appointment here scheduled at this time.  She says she will contact me or her primary care or rheumatology physician if she develops new symptoms or other ID-related concerns in the future

## 2019-06-04 ENCOUNTER — ANCILLARY PROCEDURE (OUTPATIENT)
Dept: GENERAL RADIOLOGY | Facility: CLINIC | Age: 54
End: 2019-06-04
Attending: INTERNAL MEDICINE
Payer: COMMERCIAL

## 2019-06-04 DIAGNOSIS — Z86.19 HISTORY OF COCCIDIOIDOMYCOSIS: ICD-10-CM

## 2019-06-05 ENCOUNTER — TELEPHONE (OUTPATIENT)
Dept: INFECTIOUS DISEASES | Facility: CLINIC | Age: 54
End: 2019-06-05

## 2019-06-05 NOTE — TELEPHONE ENCOUNTER
[facility] Call Center    Phone Message    May a detailed message be left on voicemail: yes    Reason for Call: Other: PT is rquesting a call back to discuss chest x-ray.  Please follow up.      Action Taken: Message routed to:  [facility] ([facility]): ID

## 2019-06-07 ENCOUNTER — MYC MEDICAL ADVICE (OUTPATIENT)
Dept: INFECTIOUS DISEASES | Facility: CLINIC | Age: 54
End: 2019-06-07

## 2019-06-11 ENCOUNTER — TELEPHONE (OUTPATIENT)
Dept: INFECTIOUS DISEASES | Facility: CLINIC | Age: 54
End: 2019-06-11

## 2019-06-11 NOTE — TELEPHONE ENCOUNTER
[facility] Call Center    Phone Message    May a detailed message be left on voicemail: yes    Reason for Call: Regarding results from chest xray last week, wants to know if there are next steps needed. Please call.     Action Taken: Message routed to:  [facility] ([facility]): Infectious Disease

## 2019-09-29 ENCOUNTER — HEALTH MAINTENANCE LETTER (OUTPATIENT)
Age: 54
End: 2019-09-29

## 2020-03-15 ENCOUNTER — HEALTH MAINTENANCE LETTER (OUTPATIENT)
Age: 55
End: 2020-03-15

## 2021-01-06 ENCOUNTER — MYC MEDICAL ADVICE (OUTPATIENT)
Dept: CARE COORDINATION | Facility: CLINIC | Age: 56
End: 2021-01-06

## 2021-01-06 ENCOUNTER — DOCUMENTATION ONLY (OUTPATIENT)
Dept: CARE COORDINATION | Facility: CLINIC | Age: 56
End: 2021-01-06

## 2021-01-06 LAB — MAMMOGRAM: NORMAL

## 2021-01-06 NOTE — TELEPHONE ENCOUNTER
RECORDS RECEIVED FROM: return patient.  all records internal Left ankle pain Status post right first MTP joint cheilectomy with left ankle arthroscopy and left second and third metatarsal shortening osteotomy performed on 03/26/2014.    DATE RECEIVED: Jan 19, 2021     NOTES STATUS DETAILS   OFFICE NOTE from referring provider Internal    OFFICE NOTE from other specialist N/A    DISCHARGE SUMMARY from hospital N/A    DISCHARGE REPORT from the ER N/A    OPERATIVE REPORT Internal    MEDICATION LIST Internal    IMPLANT RECORD/STICKER N/A    LABS     CBC/DIFF N/A    CULTURES N/A    INJECTIONS DONE IN RADIOLOGY N/A    MRI N/A    CT SCAN N/A    XRAYS (IMAGES & REPORTS) Internal    TUMOR     PATHOLOGY  Slides & report N/A    01/06/21   12:09 PM   Complete  Celia Luna CMA

## 2021-01-11 DIAGNOSIS — M25.572 LEFT ANKLE PAIN: Primary | ICD-10-CM

## 2021-01-14 ENCOUNTER — HEALTH MAINTENANCE LETTER (OUTPATIENT)
Age: 56
End: 2021-01-14

## 2021-01-14 DIAGNOSIS — Z98.890 S/P LEFT KNEE SURGERY: Primary | ICD-10-CM

## 2021-01-19 ENCOUNTER — ANCILLARY PROCEDURE (OUTPATIENT)
Dept: GENERAL RADIOLOGY | Facility: CLINIC | Age: 56
End: 2021-01-19
Attending: ORTHOPAEDIC SURGERY
Payer: COMMERCIAL

## 2021-01-19 ENCOUNTER — OFFICE VISIT (OUTPATIENT)
Dept: ORTHOPEDICS | Facility: CLINIC | Age: 56
End: 2021-01-19
Payer: COMMERCIAL

## 2021-01-19 ENCOUNTER — PRE VISIT (OUTPATIENT)
Dept: ORTHOPEDICS | Facility: CLINIC | Age: 56
End: 2021-01-19

## 2021-01-19 VITALS — WEIGHT: 190 LBS | HEIGHT: 66 IN | BODY MASS INDEX: 30.53 KG/M2

## 2021-01-19 DIAGNOSIS — M25.572 CHRONIC PAIN OF LEFT ANKLE: Primary | ICD-10-CM

## 2021-01-19 DIAGNOSIS — Z98.890 S/P LEFT KNEE SURGERY: ICD-10-CM

## 2021-01-19 DIAGNOSIS — M25.562 LEFT KNEE PAIN, UNSPECIFIED CHRONICITY: Primary | ICD-10-CM

## 2021-01-19 DIAGNOSIS — G89.29 CHRONIC PAIN OF LEFT ANKLE: Primary | ICD-10-CM

## 2021-01-19 DIAGNOSIS — M25.572 LEFT ANKLE PAIN: ICD-10-CM

## 2021-01-19 PROCEDURE — 73610 X-RAY EXAM OF ANKLE: CPT | Mod: LT | Performed by: RADIOLOGY

## 2021-01-19 PROCEDURE — 73560 X-RAY EXAM OF KNEE 1 OR 2: CPT | Mod: XU | Performed by: RADIOLOGY

## 2021-01-19 PROCEDURE — 99207 PR SATISFY VISIT NUMBER: CPT | Performed by: ORTHOPAEDIC SURGERY

## 2021-01-19 PROCEDURE — 77073 BONE LENGTH STUDIES: CPT | Performed by: RADIOLOGY

## 2021-01-19 PROCEDURE — 99203 OFFICE O/P NEW LOW 30 MIN: CPT | Performed by: ORTHOPAEDIC SURGERY

## 2021-01-19 ASSESSMENT — MIFFLIN-ST. JEOR: SCORE: 1463.58

## 2021-01-19 NOTE — NURSING NOTE
"Reason For Visit:   Chief Complaint   Patient presents with     Consult     Left ankle pain        Ht 1.676 m (5' 6\")   Wt 86.2 kg (190 lb)   BMI 30.67 kg/m      Pain Assessment  Patient Currently in Pain: Yes  0-10 Pain Scale: 4  Primary Pain Location: Ankle    Silvia Kenyon, ATC    "

## 2021-01-19 NOTE — LETTER
1/19/2021         RE: Kady Benson  [address] [address] [address]        Dear Colleague,    Thank you for referring your patient, Kady Benson, to the [facility] [place]. Please see a copy of my visit note below.    CHIEF COMPLAINT:  Left ankle pain.      HISTORY OF PRESENT ILLNESS:  Ms. Benson is a 55-year-old female who presents today for evaluation of her left ankle.  The patient has undergone already an arthroscopic debridement of the ankle performed by us in 2014.  She has been doing extremely well until now, and now she reports to have some pain and discomfort across the ankle joint, especially with any degree of push off.  This seems to be more evident when she is doing any type of cross-country skiing.      The patient has not had any formal treatment for this.  She reports to have reached a point where she would like to do some sort of intervention for it.      The patient reports now to be working from home but to still have an interest in being significantly active.  Reports that the ankle seems to be getting in the way for her to perform activities.  On today's visit also, she has an appointment to be evaluated by Dr. Billings to have an assessment of her left knee as well.      PAST MEDICAL HISTORY:  Rheumatoid arthritis.      PAST SURGICAL HISTORY:  Reviewed today.      DRUG ALLERGIES:  Multiple and reviewed.      CURRENT MEDICATIONS:  Reviewed today.      PHYSICAL EXAMINATION:  On today's visit, she presents as a pleasant female in no apparent distress with a height of 5 feet 6 and a weight of 190 pounds.  Denies to have any constitutional symptoms.      On today's visit, she presents with full active motion of the left ankle, hindfoot and midfoot joints.  CMS intact.  Skin is intact.  There is some pain with palpation of the most anteromedial gutter of the ankle joint.  There is an ossicle fairly palpable along the neck of the talus.  This  appears to be nontender as well as clearly from bone spurs.  Forefoot and hindfoot exam is unremarkable.      RADIOGRAPHIC EVALUATION:  Plain x-rays of the left ankle were reviewed today which were significant for showing a healthy joint space and the possibility of having some anterior osteophyte formation.  The patient also presented with some irregularity along the distal tibia articular surface, which is more evident on the lateral projection.      ASSESSMENT:   1.  Status post left ankle arthroscopic debridement with possible recurrent impingement.   2.  Possible left ankle arthritis.      PLAN:  I discussed with the patient that at this point I would like to proceed with a CT scan as a way to better understand the anatomy of her ankle joint.  If the CT scan shows just to have impingement, I think that she would benefit from undergoing an arthroscopic debridement.  If, on the other hand, she presents with a component of osteochondral defect or significant arthritis, we will have another conversation with her with regards to what intervention will benefit her the most.      All questions were answered.  The patient was pleased with the discussion.  The patient will follow up on a p.r.n. basis.  She has no activity restrictions.  She will be contacted via phone once the CT scan is available for review.      All questions were answered.      TT 30 minutes, CT 20 minutes.     Christopher Peres MD

## 2021-01-19 NOTE — PROGRESS NOTES
Patient is a 55-year-old female well-known to me.  I have not seen her since 2016.      She is status post multiple problems with her bilateral knees as an early adult.  She went on to have a right total knee by myself in November 2012.  She has done well with the right knee since that time.  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    She subsequently has retired and moved to [place].  She underwent a left total knee replacement in [place] in 2018.  She distinctly remembers approximately 3 to 4 weeks after her total knee, she was doing physical therapy doing open chain activities, and she felt a pop or tear or rip on the superior aspect of her left knee.  She just assumed that it likely torqued on the incision but since that time she has never felt comfortable in the same region in regards to doing any activities.    She feels that she has not been able to be as active as she would like because of this left knee issue (coincidently she also has some left ankle concerns).  She more recently moved back to [place] and try cross-country skiing and felt a distinct discomfort in the region of the proximal patella region lateral side more than medial side.  She at times felt that there might have been a slight defect in this region but she cannot say for sure.    PAST MEDICAL HISTORY:  Rheumatoid arthritis on Humira and prednisone daily in the past.  Currently on Imuran and Diflucan.     PAST SURGICAL HISTORY:     1.  11/29/2012, right total knee by Dr. Billings.   2.  02/13/2011, Berta PFA prosthesis or patellofemoral arthroplasty by Dr. Billings  3.  Left total knee arthroplasty circa 2018???    Physical exam of patient's left knee reveals neutral alignment.  No knee swelling.  Satisfactory incision.  Good straight leg raising effort without a lag.  Range of motion 0-120.  Kneecap tracks well through an active arc of motion.  Stable knee exam to varus valgus stressing    In regards to direct  palpation of the distal quadricep tendon, there is palpable tenderness over the distal lateral aspect of the quad tendon but I do not palpate a defect.  I do not feel crepitus.    Examination of patient's right knee reveals benign appearing incisions no knee swelling.  Good straight leg raising effort without a lag range of motion 0-1 35.    Assessment: Likely left quadricep tendinitis.    Even if the patient had an acute partial tear of the fact that it is gone on to have a chronic pain and chronic disability suggest incomplete healing of this area.    My thought process would be twofold    1.  I would send her to physical therapy to work on quad strength.  I believe using BFR might be advantageous to try to jumpstart the quad and see if we can overcome any potential tendinopathy in this region    2.  If this does not change her pain pattern 1 could consider an ultrasound to verify the existence of quad tendinopathy and if present by ultrasound I would consider a PRP injection.    The use of PRP was briefly discussed with her as well as the out-of-pocket payment associated with it.  She knows that she can access this through our injection clinic as needed.    We will begin with sending her to our therapist on [facility] for an eval and treat with consideration of BFR.    Room time 20 minutes consultation time 15.    Florence Billings MD  Professor Orthopedic Surgery  [facility]

## 2021-01-19 NOTE — LETTER
1/19/2021         RE: Kady Benson  [address] [address] [facility] [address]        Dear Colleague,    Thank you for referring your patient, Kady Benson, to the [facility] [place]. Please see a copy of my visit note below.    Patient is a 55-year-old female well-known to me.  I have not seen her since 2016.      She is status post multiple problems with her bilateral knees as an early adult.  She went on to have a right total knee by myself in November 2012.  She has done well with the right knee since that time.  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    She subsequently has retired and moved to [place].  She underwent a left total knee replacement in [place] in 2018.  She distinctly remembers approximately 3 to 4 weeks after her total knee, she was doing physical therapy doing open chain activities, and she felt a pop or tear or rip on the superior aspect of her left knee.  She just assumed that it likely torqued on the incision but since that time she has never felt comfortable in the same region in regards to doing any activities.    She feels that she has not been able to be as active as she would like because of this left knee issue (coincidently she also has some left ankle concerns).  She more recently moved back to [place] and try cross-country skiing and felt a distinct discomfort in the region of the proximal patella region lateral side more than medial side.  She at times felt that there might have been a slight defect in this region but she cannot say for sure.    PAST MEDICAL HISTORY:  Rheumatoid arthritis on Humira and prednisone daily in the past.  Currently on Imuran and Diflucan.     PAST SURGICAL HISTORY:     1.  11/29/2012, right total knee by Dr. Billings.   2.  02/13/2011, Berta PFA prosthesis or patellofemoral arthroplasty by Dr. Billings  3.  Left total knee arthroplasty circa 2018???    Physical exam of patient's left  knee reveals neutral alignment.  No knee swelling.  Satisfactory incision.  Good straight leg raising effort without a lag.  Range of motion 0-120.  Kneecap tracks well through an active arc of motion.  Stable knee exam to varus valgus stressing    In regards to direct palpation of the distal quadricep tendon, there is palpable tenderness over the distal lateral aspect of the quad tendon but I do not palpate a defect.  I do not feel crepitus.    Examination of patient's right knee reveals benign appearing incisions no knee swelling.  Good straight leg raising effort without a lag range of motion 0-1 35.    Assessment: Likely left quadricep tendinitis.    Even if the patient had an acute partial tear of the fact that it is gone on to have a chronic pain and chronic disability suggest incomplete healing of this area.    My thought process would be twofold    1.  I would send her to physical therapy to work on quad strength.  I believe using BFR might be advantageous to try to jumpstart the quad and see if we can overcome any potential tendinopathy in this region    2.  If this does not change her pain pattern 1 could consider an ultrasound to verify the existence of quad tendinopathy and if present by ultrasound I would consider a PRP injection.    The use of PRP was briefly discussed with her as well as the out-of-pocket payment associated with it.  She knows that she can access this through our injection clinic as needed.    We will begin with sending her to our therapist on [facility] for an eval and treat with consideration of BFR.    Room time 20 minutes consultation time 15.    Florence Billings MD  Professor Orthopedic Surgery  [facility]

## 2021-01-19 NOTE — PROGRESS NOTES
CHIEF COMPLAINT:  Left ankle pain.      HISTORY OF PRESENT ILLNESS:  Ms. Benson is a 55-year-old female who presents today for evaluation of her left ankle.  The patient has undergone already an arthroscopic debridement of the ankle performed by us in 2014.  She has been doing extremely well until now, and now she reports to have some pain and discomfort across the ankle joint, especially with any degree of push off.  This seems to be more evident when she is doing any type of cross-country skiing.      The patient has not had any formal treatment for this.  She reports to have reached a point where she would like to do some sort of intervention for it.      The patient reports now to be working from home but to still have an interest in being significantly active.  Reports that the ankle seems to be getting in the way for her to perform activities.  On today's visit also, she has an appointment to be evaluated by Dr. Billings to have an assessment of her left knee as well.      PAST MEDICAL HISTORY:  Rheumatoid arthritis.      PAST SURGICAL HISTORY:  Reviewed today.      DRUG ALLERGIES:  Multiple and reviewed.      CURRENT MEDICATIONS:  Reviewed today.      PHYSICAL EXAMINATION:  On today's visit, she presents as a pleasant female in no apparent distress with a height of 5 feet 6 and a weight of 190 pounds.  Denies to have any constitutional symptoms.      On today's visit, she presents with full active motion of the left ankle, hindfoot and midfoot joints.  CMS intact.  Skin is intact.  There is some pain with palpation of the most anteromedial gutter of the ankle joint.  There is an ossicle fairly palpable along the neck of the talus.  This appears to be nontender as well as clearly from bone spurs.  Forefoot and hindfoot exam is unremarkable.      RADIOGRAPHIC EVALUATION:  Plain x-rays of the left ankle were reviewed today which were significant for showing a healthy joint space and the possibility of having  some anterior osteophyte formation.  The patient also presented with some irregularity along the distal tibia articular surface, which is more evident on the lateral projection.      ASSESSMENT:   1.  Status post left ankle arthroscopic debridement with possible recurrent impingement.   2.  Possible left ankle arthritis.      PLAN:  I discussed with the patient that at this point I would like to proceed with a CT scan as a way to better understand the anatomy of her ankle joint.  If the CT scan shows just to have impingement, I think that she would benefit from undergoing an arthroscopic debridement.  If, on the other hand, she presents with a component of osteochondral defect or significant arthritis, we will have another conversation with her with regards to what intervention will benefit her the most.      All questions were answered.  The patient was pleased with the discussion.  The patient will follow up on a p.r.n. basis.  She has no activity restrictions.  She will be contacted via phone once the CT scan is available for review.      All questions were answered.      TT 30 minutes, CT 20 minutes.

## 2021-01-19 NOTE — NURSING NOTE
Reason For Visit:   Chief Complaint   Patient presents with     RECHECK     Left knee pain.  s/p DOS:  02/13/2011, Berta PFA         Primary MD: Fran Luna  Ref. MD: EST    ?  No  Occupation Clinical Research Manager.  Currently working? yes  Work status?  Full time.    Date of injury: no    Date of surgery & Type:   11/29/2012 Total knee arthoplasty right  Left knee injection w/ Kenelog ( 40 mg/ml)    2/11/2011 Right knee:    Exam under anesthesia. Diagnostic arthroscopy.   Patellar debridement.  Left knee uni-patellofemoral (Berta) prosthesis.    9/24/2010 Left knee exam under anesthesia. Diagnostic arthroscopy. Patellar debridement.  Medial femoral condyle debridement.     Smoker: No  Request smoking cessation information: No    There were no vitals taken for this visit.              Torie Muro LPN

## 2021-01-20 ENCOUNTER — ANCILLARY PROCEDURE (OUTPATIENT)
Dept: CT IMAGING | Facility: CLINIC | Age: 56
End: 2021-01-20
Attending: ORTHOPAEDIC SURGERY
Payer: COMMERCIAL

## 2021-01-20 DIAGNOSIS — M25.572 CHRONIC PAIN OF LEFT ANKLE: ICD-10-CM

## 2021-01-20 DIAGNOSIS — G89.29 CHRONIC PAIN OF LEFT ANKLE: ICD-10-CM

## 2021-01-20 PROCEDURE — 73700 CT LOWER EXTREMITY W/O DYE: CPT | Mod: LT | Performed by: RADIOLOGY

## 2021-01-22 ENCOUNTER — THERAPY VISIT (OUTPATIENT)
Dept: PHYSICAL THERAPY | Facility: CLINIC | Age: 56
End: 2021-01-22
Attending: ORTHOPAEDIC SURGERY
Payer: COMMERCIAL

## 2021-01-22 ENCOUNTER — TELEPHONE (OUTPATIENT)
Dept: ORTHOPEDICS | Facility: CLINIC | Age: 56
End: 2021-01-22

## 2021-01-22 DIAGNOSIS — M25.562 CHRONIC PAIN OF LEFT KNEE: Primary | ICD-10-CM

## 2021-01-22 DIAGNOSIS — M25.562 LEFT KNEE PAIN, UNSPECIFIED CHRONICITY: ICD-10-CM

## 2021-01-22 DIAGNOSIS — G89.29 CHRONIC PAIN OF LEFT KNEE: Primary | ICD-10-CM

## 2021-01-22 PROCEDURE — 97530 THERAPEUTIC ACTIVITIES: CPT | Mod: GP | Performed by: PHYSICAL THERAPIST

## 2021-01-22 PROCEDURE — 97161 PT EVAL LOW COMPLEX 20 MIN: CPT | Mod: GP | Performed by: PHYSICAL THERAPIST

## 2021-01-22 ASSESSMENT — ACTIVITIES OF DAILY LIVING (ADL)
KNEE_ACTIVITY_OF_DAILY_LIVING_SCORE: 68.57
STAND: ACTIVITY IS MINIMALLY DIFFICULT
HOW_WOULD_YOU_RATE_THE_CURRENT_FUNCTION_OF_YOUR_KNEE_DURING_YOUR_USUAL_DAILY_ACTIVITIES_ON_A_SCALE_FROM_0_TO_100_WITH_100_BEING_YOUR_LEVEL_OF_KNEE_FUNCTION_PRIOR_TO_YOUR_INJURY_AND_0_BEING_THE_INABILITY_TO_PERFORM_ANY_OF_YOUR_USUAL_DAILY_ACTIVITIES?: 65
GIVING WAY, BUCKLING OR SHIFTING OF KNEE: THE SYMPTOM AFFECTS MY ACTIVITY SLIGHTLY
RISE FROM A CHAIR: ACTIVITY IS MINIMALLY DIFFICULT
SWELLING: I HAVE THE SYMPTOM BUT IT DOES NOT AFFECT MY ACTIVITY
SIT WITH YOUR KNEE BENT: ACTIVITY IS MINIMALLY DIFFICULT
WALK: ACTIVITY IS MINIMALLY DIFFICULT
HOW_WOULD_YOU_RATE_THE_OVERALL_FUNCTION_OF_YOUR_KNEE_DURING_YOUR_USUAL_DAILY_ACTIVITIES?: ABNORMAL
GO DOWN STAIRS: ACTIVITY IS MINIMALLY DIFFICULT
KNEEL ON THE FRONT OF YOUR KNEE: I AM UNABLE TO DO THE ACTIVITY
SQUAT: ACTIVITY IS MINIMALLY DIFFICULT
GO UP STAIRS: ACTIVITY IS MINIMALLY DIFFICULT
KNEE_ACTIVITY_OF_DAILY_LIVING_SUM: 48
AS_A_RESULT_OF_YOUR_KNEE_INJURY,_HOW_WOULD_YOU_RATE_YOUR_CURRENT_LEVEL_OF_DAILY_ACTIVITY?: ABNORMAL
LIMPING: THE SYMPTOM AFFECTS MY ACTIVITY SLIGHTLY
WEAKNESS: I HAVE THE SYMPTOM BUT IT DOES NOT AFFECT MY ACTIVITY
RAW_SCORE: 48
STIFFNESS: THE SYMPTOM AFFECTS MY ACTIVITY SLIGHTLY
PAIN: THE SYMPTOM AFFECTS MY ACTIVITY SLIGHTLY

## 2021-01-22 NOTE — TELEPHONE ENCOUNTER
[facility] Call Center    Phone Message    May a detailed message be left on voicemail: yes     Reason for Call: Other: Other: Patient is wondering if imaging results are back for her ankle. Patient sees her primary doc next week and can get her pre op physical done that day if needed. Please call patient back to discuss further      Action Taken: Message routed to:  [facility] ([facility] ortho    Travel Screening: Not Applicable

## 2021-01-22 NOTE — PROGRESS NOTES
Physical Therapy Initial Examination/Evaluation  January 22, 2021    Kady Benson is a 55 year old female referred to physical therapy by Catrachita Billings MD for treatment of L knee pain following TKA with Precautions/Restrictions/MD instructions E&T    Therapist Impression:   Kady was 25 min late and shortened evaluation was performed.  Findings are consistent with Dr. Billings suggesting quadriceps tendonitis.  We will start BFR next session and initiate isometrics today for home.    Subjective:  DOI/onset: 1/2018 DOS: none  Acute Injury or Gradual Onset?: Acute injury onset  Mechanism of Injury: OKC exercises with rehab following TKA  Related PMH: R TKA Previous Treatment: PT and Surgery Effect of prior treatment: fair  Imaging: x-ray  Chief Complaint/Functional Limitations:   Bike, stairs and see below in therapy evaluation codes   Pain: rest 0 /10, activity 6/10 Location: Lateral quad insertion Frequency: Intermittent Described as: aching, sore Alleviated by: Ice, Heat, Aleve and Yoga Progression of Symptoms: Gradually getting worse. Time of day when pain is worse: Activity related  Sleeping: Interrupted due to current issue   Occupation:   Job duties: keyboarding/computer use  Current HEP/exercise regimen: Yoga  Patient's goals are see chief complaints     Other pertinent PMH/Red Flags: History of Fractures, Implated Device, Osteoarthritis, Overweight, Rheumatoid Arthritis   Barriers at home/work: None as reported by patient  Pertinent Surgical History: B TKA, L PFA prior to TKA  Medications: Avencia and Azathiopnine  General health as reported by patient: Excellent  Return to MD:  Prn    KNEE EVALUATION      Dynamic Movement Screen  Single leg stance observations: No significant findings for eyes open/closed  Double limb squat observations: Pain  Single limb squat observations: Knee valgus, pain    Range of Motion   Hip ROM WNL  Knee ROM Extension Flexion   Left 5-0 120   Right 5-0 120       Hip and Knee Strength   MMT Hip Abduction Hip Extension Knee Flexion Knee Extension   Left 3+/5 4+/5 5/5 5/5 pain   Right 3+/5 4+/5 5/5 5/5     Knee MMT Quadriceps set Straight Leg Raise   Left Good Able   Right Good Able     Patellofemoral assessment: J-tracking negative    Palpation  Left: Mild tenderness to palpation at distal/lateral quad  Right: Not assessed    Assessment/Plan:  Patient is a 55 year old female with left side knee complaints.    Patient has the following significant findings with corresponding treatment plan.                Diagnosis 1:  L knee pain following TKA  Pain -  hot/cold therapy, splint/taping/bracing/orthotics, self management, education and home program  Decreased strength - therapeutic exercise and therapeutic activities    Therapy Evaluation Codes:   1) History comprised of:   Personal factors that impact the plan of care:      Time since onset of symptoms.    Comorbidity factors that impact the plan of care are:      None.     Medications impacting care: None.  2) Examination of Body Systems comprised of:   Body structures and functions that impact the plan of care:      Knee.   Activity limitations that impact the plan of care are:      Sports and Stairs.  3) Clinical presentation characteristics are:   Evolving/Changing  4) Decision-Making    Low complexity using standardized patient assessment instrument and/or measureable assessment of functional outcome.  Cumulative Therapy Evaluation is: Low complexity.    Previous and current functional limitations:  (See Goal Flow Sheet for this information)    Short term and Long term goals: (See Goal Flow Sheet for this information)     Communication ability:  Patient appears to be able to clearly communicate and understand verbal and written communication and follow directions correctly.  Treatment Explanation - The following has been discussed with the patient:   RX ordered/plan of care  Anticipated outcomes  Possible risks and  side effects  This patient would benefit from PT intervention to resume normal activities.   Rehab potential is good.    Frequency:  2 X week, once daily  Duration:  for 6 weeks  Discharge Plan:  Achieve all LTG.  Independent in home treatment program.  Reach maximal therapeutic benefit.    Please refer to the daily flowsheet for treatment today, total treatment time and time spent performing 1:1 timed codes.     Please refer to the daily flowsheet for treatment today, total treatment time and time spent performing 1:1 timed codes.

## 2021-01-22 NOTE — TELEPHONE ENCOUNTER
I called the patient and LVM letting her know that we have alerted Dr. Peres to review her imaging. I let her know we will give her a call when he gets back to us. I did also inform her that he is on vacation next week so there may be a delay but we should hear back from him and be able to call her early next week at the latest.    Silvia

## 2021-01-29 ENCOUNTER — THERAPY VISIT (OUTPATIENT)
Dept: PHYSICAL THERAPY | Facility: CLINIC | Age: 56
End: 2021-01-29
Payer: COMMERCIAL

## 2021-01-29 DIAGNOSIS — M25.562 LEFT KNEE PAIN, UNSPECIFIED CHRONICITY: Primary | ICD-10-CM

## 2021-01-29 PROCEDURE — 97110 THERAPEUTIC EXERCISES: CPT | Mod: GP | Performed by: PHYSICAL THERAPIST

## 2021-01-29 PROCEDURE — 97140 MANUAL THERAPY 1/> REGIONS: CPT | Mod: GP | Performed by: PHYSICAL THERAPIST

## 2021-01-29 NOTE — PROGRESS NOTES
Date  1/29/21 Limb Occlusion Pressure  178 Percent (%) Occlusion  80 Training Occlusion Pressure  142 Exercises Performed  SL leg press 2#  Knee extension black band  30/15/15/15   Total time under tourniquet  13'   Immediate effects  5/10 fatigue Lingering effects (from previous session)  none   NOTES: none    Blood Flow Restriction Training: Contraindications and precautions reviewed, pt safe for use of  modality, Risks and benefits discussed; pt gave informed consent

## 2021-02-02 DIAGNOSIS — M25.572 LEFT ANKLE PAIN: Primary | ICD-10-CM

## 2021-02-05 ENCOUNTER — THERAPY VISIT (OUTPATIENT)
Dept: PHYSICAL THERAPY | Facility: CLINIC | Age: 56
End: 2021-02-05
Payer: COMMERCIAL

## 2021-02-05 DIAGNOSIS — M25.562 LEFT KNEE PAIN, UNSPECIFIED CHRONICITY: Primary | ICD-10-CM

## 2021-02-05 PROCEDURE — 97140 MANUAL THERAPY 1/> REGIONS: CPT | Mod: GP | Performed by: PHYSICAL THERAPIST

## 2021-02-05 PROCEDURE — 97110 THERAPEUTIC EXERCISES: CPT | Mod: GP | Performed by: PHYSICAL THERAPIST

## 2021-02-05 NOTE — PROGRESS NOTES
Date  2/5/21 Limb Occlusion Pressure  176 Percent (%) Occlusion  80 Training Occlusion Pressure  141 Exercises Performed  SL leg press 3#  SL knee extension  30/15/15/15   Total time under tourniquet  13'   Immediate effects  Fatigue 10/10 Lingering effects (from previous session)  none   NOTES: none    Blood Flow Restriction Training: Contraindications and precautions reviewed, pt safe for use of  modality, Risks and benefits discussed; pt gave informed consent

## 2021-02-09 ENCOUNTER — VIRTUAL VISIT (OUTPATIENT)
Dept: ORTHOPEDICS | Facility: CLINIC | Age: 56
End: 2021-02-09
Payer: COMMERCIAL

## 2021-02-09 DIAGNOSIS — M25.572 PAIN IN JOINT, ANKLE AND FOOT, LEFT: Primary | ICD-10-CM

## 2021-02-09 PROCEDURE — 99212 OFFICE O/P EST SF 10 MIN: CPT | Mod: TEL | Performed by: ORTHOPAEDIC SURGERY

## 2021-02-09 NOTE — PROGRESS NOTES
TELEPHONE VISIT      CHIEF COMPLAINT:  Left ankle pain.      HISTORY OF PRESENT ILLNESS:  Ms. Benson it was contacted via phone secondary to the pandemic.  The patient authorized the encounter.      We discussed with her the CT scan findings.  The fact that she presents with pathology across the ankle and subtalar joint is somewhat confusing.  Presents with a large amount of osteophytes along the most posterior portion of the ankle joint as well as along the lateral portion of the ankle.      PLAN:  After a lengthy discussion, we came to the agreement of undergoing a diagnostic injection of the ankle joint with lidocaine only and then to understand how she responds to the pain.  I am afraid that if we just do debridement of the ankle joint, she may not have enough relief, and then we will have to address the subtalar joint as well.      The patient will contact us once the injection is performed.  All questions were answered.  She has no activity restrictions.      TT 20 minutes.  CT 15 minutes.

## 2021-02-09 NOTE — LETTER
2/9/2021         RE: Kady Benson  [address] [address]        Dear Colleague,    Thank you for referring your patient, Kady Benson, to the [facility] [place]. Please see a copy of my visit note below.    TELEPHONE VISIT      CHIEF COMPLAINT:  Left ankle pain.      HISTORY OF PRESENT ILLNESS:  Ms. Benson it was contacted via phone secondary to the pandemic.  The patient authorized the encounter.      We discussed with her the CT scan findings.  The fact that she presents with pathology across the ankle and subtalar joint is somewhat confusing.  Presents with a large amount of osteophytes along the most posterior portion of the ankle joint as well as along the lateral portion of the ankle.      PLAN:  After a lengthy discussion, we came to the agreement of undergoing a diagnostic injection of the ankle joint with lidocaine only and then to understand how she responds to the pain.  I am afraid that if we just do debridement of the ankle joint, she may not have enough relief, and then we will have to address the subtalar joint as well.      The patient will contact us once the injection is performed.  All questions were answered.  She has no activity restrictions.      TT 20 minutes.  CT 15 minutes.           Christopher Peres MD

## 2021-02-12 ENCOUNTER — THERAPY VISIT (OUTPATIENT)
Dept: PHYSICAL THERAPY | Facility: CLINIC | Age: 56
End: 2021-02-12
Payer: COMMERCIAL

## 2021-02-12 DIAGNOSIS — M25.562 LEFT KNEE PAIN, UNSPECIFIED CHRONICITY: Primary | ICD-10-CM

## 2021-02-12 PROCEDURE — 97140 MANUAL THERAPY 1/> REGIONS: CPT | Mod: GP | Performed by: PHYSICAL THERAPIST

## 2021-02-12 PROCEDURE — 97110 THERAPEUTIC EXERCISES: CPT | Mod: GP | Performed by: PHYSICAL THERAPIST

## 2021-02-12 NOTE — PROGRESS NOTES
Date  2/12/21 Limb Occlusion Pressure  192 Percent (%) Occlusion  80 Training Occlusion Pressure  154 Exercises Performed  Sl leg press 3# 30/15/15   Total time under tourniquet  6.5'   Immediate effects  Pain, fatigue 10/10 reached Lingering effects (from previous session)  none   NOTES: none    Blood Flow Restriction Training: Contraindications and precautions reviewed, pt safe for use of  modality, Risks and benefits discussed; pt gave informed consent

## 2021-02-19 ENCOUNTER — THERAPY VISIT (OUTPATIENT)
Dept: PHYSICAL THERAPY | Facility: CLINIC | Age: 56
End: 2021-02-19
Payer: COMMERCIAL

## 2021-02-19 DIAGNOSIS — M25.562 LEFT KNEE PAIN, UNSPECIFIED CHRONICITY: Primary | ICD-10-CM

## 2021-02-19 PROCEDURE — 97140 MANUAL THERAPY 1/> REGIONS: CPT | Mod: GP | Performed by: PHYSICAL THERAPIST

## 2021-02-19 PROCEDURE — 97110 THERAPEUTIC EXERCISES: CPT | Mod: GP | Performed by: PHYSICAL THERAPIST

## 2021-02-19 NOTE — PROGRESS NOTES
Date  2/19/21 Limb Occlusion Pressure  214 Percent (%) Occlusion  70 Training Occlusion Pressure  150 Exercises Performed  SL leg press 3# 30/15/15/15   Total time under tourniquet  6.5'   Immediate effects  Cramping in quad Lingering effects (from previous session)  none   NOTES: Occlusion changed to 70% in attempt to improve pt's tolerance     Blood Flow Restriction Training: Contraindications and precautions reviewed, pt safe for use of  modality, Risks and benefits discussed; pt gave informed consent

## 2021-02-26 ENCOUNTER — THERAPY VISIT (OUTPATIENT)
Dept: PHYSICAL THERAPY | Facility: CLINIC | Age: 56
End: 2021-02-26
Payer: COMMERCIAL

## 2021-02-26 DIAGNOSIS — G89.29 CHRONIC PAIN OF LEFT KNEE: ICD-10-CM

## 2021-02-26 DIAGNOSIS — M25.562 CHRONIC PAIN OF LEFT KNEE: ICD-10-CM

## 2021-02-26 PROCEDURE — 97110 THERAPEUTIC EXERCISES: CPT | Mod: GP | Performed by: PHYSICAL THERAPIST

## 2021-02-26 PROCEDURE — 97530 THERAPEUTIC ACTIVITIES: CPT | Mod: GP | Performed by: PHYSICAL THERAPIST

## 2021-02-26 NOTE — PROGRESS NOTES
"Therapist Impression:   Changed program to 4 x 40\" isometrics knee extensions (daily) and squat holds with weight shift (every other day), single leg squat 3 x 10-15 4 x week.  Adjusted pressure to 60% with no improvement in results    GOALS: Hiking, yoga, pilates, biking    NEXT: Continue POC for 4-6 weeks    PTRX: none    Subjective:  Not feeling the improvement as much anymore.  Has not been riding bike since Tuesday, just walking.  Knee is at 70%.  Need feeling of more stability.  Limiting her activities because of the knee.    HEP: pilates, biking, walking, yoga    Objective:    Date  2/26/21 Limb Occlusion Pressure  213 Percent (%) Occlusion  60 Training Occlusion Pressure  128 Exercises Performed  SL leg press SH 3 3 pl 30/cramping, needed to stretch 5/double leg 15/15   Total time under tourniquet  6.5'   Immediate effects  Cramping in quad    Tired Lingering effects (from previous session)  none   NOTES:     Blood Flow Restriction Training: Contraindications and precautions reviewed, pt safe for use of  modality, Risks and benefits discussed; pt gave informed consent    "

## 2021-04-02 ENCOUNTER — TELEPHONE (OUTPATIENT)
Dept: ORTHOPEDICS | Facility: CLINIC | Age: 56
End: 2021-04-02

## 2021-04-02 NOTE — TELEPHONE ENCOUNTER
Patient was called to schedule an appointment with Dr. Jimenez for a diagnostic ultrasound of her left knee for quad tendonapathy.  She was offered and accepted an appointment next Wednesday.  She has our location and phone number.

## 2021-04-02 NOTE — TELEPHONE ENCOUNTER
DIAGNOSIS: Left knee apin.  Diagnostic ultra sound of quad tendon.  sp/ left TKA in 2018.  Ref. Dr Billings   APPOINTMENT DATE: 4.7.21   NOTES STATUS DETAILS   OFFICE NOTE from referring provider N/A    OFFICE NOTE from other specialist Internal 1.19.21 Dr Florence Billings, [facility]  10.4.16  2.5.13  7.10.12  More in Epic   DISCHARGE SUMMARY from hospital Internal 11.29.12-12.1.12 [facility]   DISCHARGE REPORT from the ER N/A    OPERATIVE REPORT Internal 11.29.12 Dr Billings  2.13.11 Dr Billings   EMG report N/A    MEDICATION LIST Internal    MRI N/A    DEXA (osteoporosis/bone health) N/A    CT SCAN N/A    XRAYS (IMAGES & REPORTS) Internal 1.19.21 L knee, 6 foot standing  10.4.16 B knee, 6 foot standing  More in Epic

## 2021-04-07 ENCOUNTER — OFFICE VISIT (OUTPATIENT)
Dept: ORTHOPEDICS | Facility: CLINIC | Age: 56
End: 2021-04-07
Payer: COMMERCIAL

## 2021-04-07 ENCOUNTER — PRE VISIT (OUTPATIENT)
Dept: ORTHOPEDICS | Facility: CLINIC | Age: 56
End: 2021-04-07

## 2021-04-07 VITALS — BODY MASS INDEX: 30.53 KG/M2 | HEIGHT: 66 IN | WEIGHT: 190 LBS

## 2021-04-07 DIAGNOSIS — M76.899 TENDINOSIS OF QUADRICEPS TENDON: Primary | ICD-10-CM

## 2021-04-07 PROCEDURE — 76882 US LMTD JT/FCL EVL NVASC XTR: CPT | Performed by: FAMILY MEDICINE

## 2021-04-07 ASSESSMENT — MIFFLIN-ST. JEOR: SCORE: 1463.58

## 2021-04-07 NOTE — PROGRESS NOTES
CHIEF COMPLAINT:  Pain of the Left Knee       HISTORY OF PRESENT ILLNESS  Ms. Benson is a pleasant 55 year old year old female who presents to clinic today with Left knee pain.  Kady explains that she had a TKA in 2018 in [place], was doing post PT and felt a pop.Then was doing PT with Daisha, restrictive blood flow therapy and it failed.     Onset: gradual  Location: right knee  Quality:  aching, dull and sharp  Duration: 6 months   Severity: 6/10 at worst  Timing:intermittent episodes  Modifying factors:  resting and non-use makes it better, movement and use makes it worse  Associated signs & symptoms: pain  Previous similar pain: Yes  Treatments to date:PT     Additional history: as documented    Review of Systems:    Have you recently had a a fever, chills, weight loss? No    Do you have any vision problems? No    Do you have any chest pain or edema? No    Do you have any shortness of breath or wheezing?  No    Do you have stomach problems? No    Do you have any numbness or focal weakness? No    Do you have diabetes? No    Do you have problems with bleeding or clotting? No    Do you have an rashes or other skin lesions? No    MEDICAL HISTORY  Patient Active Problem List   Diagnosis     Chronic pain of left knee     RA (rheumatoid arthritis) (H)     Total knee replacement status     Common wart       Current Outpatient Medications   Medication Sig Dispense Refill     acetaminophen (TYLENOL ARTHRITIS PAIN) 650 MG CR tablet Take 650 mg by mouth every 8 hours as needed.       amoxicillin (AMOXIL) 500 MG tablet Take 2 grams one hour before procedure (Patient not taking: Reported on 5/1/2019) 4 tablet 2     Artificial Tear Solution (HYPOTEARS PF OP) Apply  to eye as needed.       azaTHIOprine (IMURAN) 25 mg TABS half-tab Take by mouth daily       CALCIUM PO Take 1 tablet by mouth daily       fish oil-omega-3 fatty acids 1000 MG capsule Take 2 g by mouth daily       fluconazole (DIFLUCAN) 200 MG tablet Take 2  tablets (400 mg) by mouth At Bedtime (Patient not taking: Reported on 5/1/2019) 60 tablet 11     glucosamine-chondroitin 500-400 MG CAPS Take 1 capsule by mouth daily Shellfish free       MAGNESIUM PO Take 1 tablet by mouth 2 times daily        Milk Thistle 175 MG TABS Take by mouth daily       Multiple Vitamin (MULTIVITAMIN) per tablet Take 1 tablet by mouth daily.       naproxen sodium (ALEVE) 220 MG capsule Take 220 mg by mouth daily as needed (pain)        ORENCIA CLICKJECT 125 MG/ML SOAJ auto-injector Take 125 mg by mouth once a week        Turmeric 500 MG CAPS Take by mouth daily          Allergies   Allergen Reactions     Shellfish Allergy Anaphylaxis     Iodine-131 Other (See Comments)     Sulfasalazine Rash     Vancomycin Rash     Wellbutrin [Bupropion] Rash       Family History   Problem Relation Age of Onset     Uterine Cancer Mother      Skin Cancer Father      Heart Disease Maternal Grandmother      Heart Disease Paternal Grandmother      Prostate Cancer Paternal Grandfather      Stomach Cancer Paternal Grandfather        Additional medical/Social/Surgical histories reviewed in [facility] and updated as appropriate.

## 2021-04-07 NOTE — PROGRESS NOTES
Kady is here for a diagnostic ultrasound at the request of Dr. Billings.  This is specifically to evaluate her quad tendon.    Ultrasound Exam - Left Knee    Musculoskeletal ultrasound exam of the left knee was performed both in B-mode and color flow doppler.  Images were obtained in longitudinal and axial views and permanently stored for the patient's medical record.  Normal gross anatomy observed.  Structures visualized include quad tendon, superficial knee joint, patella, and distal femur, as well as the peripheral musculature.    Hypoechoic areas noted within the distal aspect of the quad tendon, centrally.  Small osteophyte visualized at the proximal pole of the tibia.  Neovascularization of the surrounding vessels was observed.    Findings consistent with tendinosis of the quadriceps tendon with a possible, small partial tear.    Moises Barksdale DO CAQSM

## 2021-04-07 NOTE — LETTER
4/7/2021         RE: Kady Benson  [address] [address]        Dear Colleague,    Thank you for referring your patient, Kady Benson, to the [facility]. Please see a copy of my visit note below.    Kady is here for a diagnostic ultrasound at the request of Dr. Billings.  This is specifically to evaluate her quad tendon.    Ultrasound Exam - Left Knee    Musculoskeletal ultrasound exam of the left knee was performed both in B-mode and color flow doppler.  Images were obtained in longitudinal and axial views and permanently stored for the patient's medical record.  Normal gross anatomy observed.  Structures visualized include quad tendon, superficial knee joint, patella, and distal femur, as well as the peripheral musculature.    Hypoechoic areas noted within the distal aspect of the quad tendon, centrally.  Small osteophyte visualized at the proximal pole of the tibia.  Neovascularization of the surrounding vessels was observed.    Findings consistent with tendinosis of the quadriceps tendon with a possible, small partial tear.    Moises Barksdale DO CAQSM        CHIEF COMPLAINT:  Pain of the Left Knee       HISTORY OF PRESENT ILLNESS  Ms. Benson is a pleasant 55 year old year old female who presents to clinic today with Left knee pain.  Kady explains that she had a TKA in 2018 in [place], was doing post PT and felt a pop.Then was doing PT with [facility], restrictive blood flow therapy and it failed.     Onset: gradual  Location: right knee  Quality:  aching, dull and sharp  Duration: 6 months   Severity: 6/10 at worst  Timing:intermittent episodes  Modifying factors:  resting and non-use makes it better, movement and use makes it worse  Associated signs & symptoms: pain  Previous similar pain: Yes  Treatments to date:PT     Additional history: as documented    Review of Systems:    Have you recently had a a fever, chills, weight loss? No    Do you  have any vision problems? No    Do you have any chest pain or edema? No    Do you have any shortness of breath or wheezing?  No    Do you have stomach problems? No    Do you have any numbness or focal weakness? No    Do you have diabetes? No    Do you have problems with bleeding or clotting? No    Do you have an rashes or other skin lesions? No    MEDICAL HISTORY  Patient Active Problem List   Diagnosis     Chronic pain of left knee     RA (rheumatoid arthritis) (H)     Total knee replacement status     Common wart       Current Outpatient Medications   Medication Sig Dispense Refill     acetaminophen (TYLENOL ARTHRITIS PAIN) 650 MG CR tablet Take 650 mg by mouth every 8 hours as needed.       amoxicillin (AMOXIL) 500 MG tablet Take 2 grams one hour before procedure (Patient not taking: Reported on 5/1/2019) 4 tablet 2     Artificial Tear Solution (HYPOTEARS PF OP) Apply  to eye as needed.       azaTHIOprine (IMURAN) 25 mg TABS half-tab Take by mouth daily       CALCIUM PO Take 1 tablet by mouth daily       fish oil-omega-3 fatty acids 1000 MG capsule Take 2 g by mouth daily       fluconazole (DIFLUCAN) 200 MG tablet Take 2 tablets (400 mg) by mouth At Bedtime (Patient not taking: Reported on 5/1/2019) 60 tablet 11     glucosamine-chondroitin 500-400 MG CAPS Take 1 capsule by mouth daily Shellfish free       MAGNESIUM PO Take 1 tablet by mouth 2 times daily        Milk Thistle 175 MG TABS Take by mouth daily       Multiple Vitamin (MULTIVITAMIN) per tablet Take 1 tablet by mouth daily.       naproxen sodium (ALEVE) 220 MG capsule Take 220 mg by mouth daily as needed (pain)        ORENCIA CLICKJECT 125 MG/ML SOAJ auto-injector Take 125 mg by mouth once a week        Turmeric 500 MG CAPS Take by mouth daily          Allergies   Allergen Reactions     Shellfish Allergy Anaphylaxis     Iodine-131 Other (See Comments)     Sulfasalazine Rash     Vancomycin Rash     Wellbutrin [Bupropion] Rash       Family History    Problem Relation Age of Onset     Uterine Cancer Mother      Skin Cancer Father      Heart Disease Maternal Grandmother      Heart Disease Paternal Grandmother      Prostate Cancer Paternal Grandfather      Stomach Cancer Paternal Grandfather        Additional medical/Social/Surgical histories reviewed in EPIC and updated as appropriate.

## 2021-08-23 ENCOUNTER — MYC MEDICAL ADVICE (OUTPATIENT)
Dept: ORTHOPEDICS | Facility: CLINIC | Age: 56
End: 2021-08-23

## 2021-10-24 ENCOUNTER — HEALTH MAINTENANCE LETTER (OUTPATIENT)
Age: 56
End: 2021-10-24

## 2022-02-13 ENCOUNTER — HEALTH MAINTENANCE LETTER (OUTPATIENT)
Age: 57
End: 2022-02-13

## 2022-08-30 NOTE — TELEPHONE ENCOUNTER
DIAGNOSIS: (L) Ankle / No Imaging / Self.Previous   APPOINTMENT DATE: 9.20.22   NOTES STATUS DETAILS   OFFICE NOTE from other specialist Internal 1.19.21 Dr Christopher Peres, [facility]  9.23.14  7.22.14  5.6.14  2.25.14   OPERATIVE REPORT Internal 3.26.14 Dr Peres   MEDICATION LIST Internal    LABS     CBC/DIFF Care Everywhere    CT SCAN Internal 1.20.21 L ankle   XRAYS (IMAGES & REPORTS) Internal 1.19.21 L ankle

## 2022-09-15 DIAGNOSIS — M25.572 LEFT ANKLE PAIN: Primary | ICD-10-CM

## 2022-09-18 ASSESSMENT — ENCOUNTER SYMPTOMS
NECK PAIN: 0
BACK PAIN: 0
STIFFNESS: 1
MUSCLE CRAMPS: 1
JOINT SWELLING: 1
MYALGIAS: 0
ARTHRALGIAS: 1
MUSCLE WEAKNESS: 0

## 2022-09-20 ENCOUNTER — OFFICE VISIT (OUTPATIENT)
Dept: ORTHOPEDICS | Facility: CLINIC | Age: 57
End: 2022-09-20
Payer: COMMERCIAL

## 2022-09-20 ENCOUNTER — ANCILLARY PROCEDURE (OUTPATIENT)
Dept: GENERAL RADIOLOGY | Facility: CLINIC | Age: 57
End: 2022-09-20
Attending: ORTHOPAEDIC SURGERY
Payer: COMMERCIAL

## 2022-09-20 ENCOUNTER — PRE VISIT (OUTPATIENT)
Dept: ORTHOPEDICS | Facility: CLINIC | Age: 57
End: 2022-09-20

## 2022-09-20 VITALS — WEIGHT: 197 LBS | HEIGHT: 66 IN | BODY MASS INDEX: 31.66 KG/M2

## 2022-09-20 DIAGNOSIS — M25.572 LEFT ANKLE PAIN: ICD-10-CM

## 2022-09-20 DIAGNOSIS — M19.079 ANKLE ARTHRITIS: Primary | ICD-10-CM

## 2022-09-20 DIAGNOSIS — M25.572 PAIN IN JOINT, ANKLE AND FOOT, LEFT: ICD-10-CM

## 2022-09-20 PROCEDURE — 99213 OFFICE O/P EST LOW 20 MIN: CPT | Performed by: ORTHOPAEDIC SURGERY

## 2022-09-20 PROCEDURE — 73610 X-RAY EXAM OF ANKLE: CPT | Mod: LT | Performed by: RADIOLOGY

## 2022-09-20 NOTE — NURSING NOTE
"Reason For Visit:   Chief Complaint   Patient presents with     RECHECK     Having quite a bit of pain in the left ankle since the beginning of this year. Feels that the hardware may be backing out. No injuries.       Ht 1.676 m (5' 6\")   Wt 89.4 kg (197 lb)   BMI 31.80 kg/m           Celia Burgess ATC    "

## 2022-09-20 NOTE — LETTER
9/20/2022     RE: Kady Benson  [address] [address]    Dear Colleague,    Thank you for referring your patient, Kady Benson, to the [facility] [place]. Please see a copy of my visit note below.    CHIEF COMPLAINT:    1.  Status post left foot second and third metatarsal shortening osteotomies.  2.  Left ankle arthritis.  3.  Status post left ankle arthroscopic debridement.    HISTORY OF PRESENT ILLNESS:  Ms. Benson was evaluated today.  She reports to have some pain and discomfort in the ankle.  The patient was evaluated via phone in 02/2021.  At that time, we recommended to proceed with a corticosteroid injection into the left ankle.  Unfortunately, because she did not develop enough pain and discomfort, she never pursued injection.  Now, she reports to have enough pain and discomfort.    PHYSICAL EXAMINATION:  On today's exam, she presents with full range of motion of the left ankle, hindfoot and midfoot joints.  CMS intact.  Skin is intact.  There is no effusion.  The patient presents with a painful bump along the dorsal medial aspect of the first metatarsal, which is most likely a component of exostosis.  The patient does not present with any hardware at that level.    IMAGING:  Three views of the left ankle were obtained today, which are significant for showing some very mild osteoarthritis without any obvious impingement.    ASSESSMENT:   1.  Left ankle arthritis.  2.  Status post left second and third metatarsal shortening osteotomies and ankle arthroscopy.    3.  Painful exostosis, left first metatarsal.    PLAN:  Discussed with the patient that, at this point, my recommendation is to proceed with injection of the ankle joint.  The injection will be performed under fluoroscopic control with lidocaine and Kenalog.  In the meantime, she has no activity restrictions.    All questions were answered.  TT:  20 minutes.  CT:  15 minutes.    Christopher  Arpit Peres MD

## 2022-10-07 ENCOUNTER — HOSPITAL ENCOUNTER (OUTPATIENT)
Dept: GENERAL RADIOLOGY | Facility: CLINIC | Age: 57
Discharge: HOME | End: 2022-10-07
Attending: ORTHOPAEDIC SURGERY | Admitting: ORTHOPAEDIC SURGERY
Payer: COMMERCIAL

## 2022-10-07 VITALS — DIASTOLIC BLOOD PRESSURE: 86 MMHG | OXYGEN SATURATION: 97 % | HEART RATE: 64 BPM | SYSTOLIC BLOOD PRESSURE: 121 MMHG

## 2022-10-07 DIAGNOSIS — M19.079 ANKLE ARTHRITIS: ICD-10-CM

## 2022-10-07 PROCEDURE — 250N000009 HC RX 250: Performed by: ORTHOPAEDIC SURGERY

## 2022-10-07 PROCEDURE — 77002 NEEDLE LOCALIZATION BY XRAY: CPT

## 2022-10-07 PROCEDURE — 250N000011 HC RX IP 250 OP 636: Performed by: ORTHOPAEDIC SURGERY

## 2022-10-07 RX ORDER — LIDOCAINE HYDROCHLORIDE 10 MG/ML
30 INJECTION, SOLUTION EPIDURAL; INFILTRATION; INTRACAUDAL; PERINEURAL ONCE
Status: COMPLETED | OUTPATIENT
Start: 2022-10-07 | End: 2022-10-07

## 2022-10-07 RX ORDER — GADOBUTROL 604.72 MG/ML
7.5 INJECTION INTRAVENOUS ONCE
Status: DISCONTINUED | OUTPATIENT
Start: 2022-10-07 | End: 2022-10-07 | Stop reason: CLARIF

## 2022-10-07 RX ORDER — ETHYL CHLORIDE 100 %
1 AEROSOL, SPRAY (ML) TOPICAL ONCE
Status: COMPLETED | OUTPATIENT
Start: 2022-10-07 | End: 2022-10-07

## 2022-10-07 RX ORDER — BUPIVACAINE HYDROCHLORIDE 5 MG/ML
10 INJECTION, SOLUTION EPIDURAL; INTRACAUDAL ONCE
Status: DISCONTINUED | OUTPATIENT
Start: 2022-10-07 | End: 2022-10-07 | Stop reason: CLARIF

## 2022-10-07 RX ORDER — TRIAMCINOLONE ACETONIDE 40 MG/ML
40 INJECTION, SUSPENSION INTRA-ARTICULAR; INTRAMUSCULAR ONCE
Status: COMPLETED | OUTPATIENT
Start: 2022-10-07 | End: 2022-10-07

## 2022-10-07 RX ADMIN — Medication 1 APPLICATION.: at 13:16

## 2022-10-07 RX ADMIN — TRIAMCINOLONE ACETONIDE 40 MG: 40 INJECTION, SUSPENSION INTRA-ARTICULAR; INTRAMUSCULAR at 13:27

## 2022-10-07 RX ADMIN — LIDOCAINE HYDROCHLORIDE 2 ML: 10 INJECTION, SOLUTION EPIDURAL; INFILTRATION; INTRACAUDAL; PERINEURAL at 13:26

## 2022-10-07 RX ADMIN — LIDOCAINE HYDROCHLORIDE 2 ML: 10 INJECTION, SOLUTION EPIDURAL; INFILTRATION; INTRACAUDAL; PERINEURAL at 13:17

## 2022-10-07 NOTE — DISCHARGE INSTRUCTIONS
JOINT STEROID INJECTION DISCHARGE INSTRUCTIONS    What to expect  After the procedure, you may feel some temporary relief from the local anesthetic, but that will likely wear off within a few hours. Your symptoms may then return to pre-procedure level, or even be temporarily worse for a day or two.  For many people, the steroid begins to provide some relief within 2-3 days, but it can take up to 2 weeks. If you have no improvement in your symptoms after two weeks, please contact your referring provider to discuss next steps.  What to do  Minimize your activity today. You may resume your normal activity tomorrow as tolerated. Avoid vigorous or strenuous activity until your symptoms improve, or as directed by your referring provider.   You may shower tomorrow, but do not submerge in bathtub, hot tub or pool for 48 hours.    Use ice packs as needed for discomfort.  You may resume all medications, including blood thinners.  You may take over the counter acetaminophen (Tylenol) or ibuprofen (Motrin, Advil) for mild discomfort after the procedure.    What to watch for  Bruising or slight swelling at the puncture site can be normal. If you begin to develop redness or excessive swelling at the site, fever over 1010F, notable increase in pain, weakness, or numbness, please contact your primary care or referring provider.  Side effects from the steroid are often mild and go away in a few days. Common side effects may include facial flushing, restlessness, irritability, difficulty sleeping, elevated blood pressure, and increased blood sugar.   If you have diabetes, monitor your blood sugar closely. Contact the provider who manages your diabetes to help you control your blood sugar if needed.  If you have questions or concerns  You may contact the [facility] Radiology Department at [phone] between 8am-4:30pm Monday through Friday.  If you have urgent questions outside of these normal business hours, please contact  the [facility] on-call physician at [phone].    The provider who performed your procedure was Harinder Claros PA-C

## 2022-11-01 ENCOUNTER — VIRTUAL VISIT (OUTPATIENT)
Dept: ORTHOPEDICS | Facility: CLINIC | Age: 57
End: 2022-11-01
Payer: COMMERCIAL

## 2022-11-01 DIAGNOSIS — M25.572 PAIN IN JOINT, ANKLE AND FOOT, LEFT: Primary | ICD-10-CM

## 2022-11-01 PROCEDURE — 99207 PR NO BILLABLE SERVICE THIS VISIT: CPT | Mod: 95 | Performed by: ORTHOPAEDIC SURGERY

## 2022-11-01 NOTE — PROGRESS NOTES
CHIEF COMPLAINT:    1.  Left ankle arthritis status post left ankle arthroscopic debridement in 2014.  2.  Status post left second and third metatarsal shortening osteotomies.    HISTORY OF PRESENT ILLNESS:  Ms. Benson was interviewed today via phone secondary to the pandemic.  The patient authorized the encounter.    The patient reports to have still pain and discomfort in her ankle after an injection that she underwent on 10/07/2022.  The patient is also leaving for [place] for wintertime within the next week and a half.    I discussed with her that at this point, we do not have time to provide her with a brace that will be effective in alleviating her symptoms.  Therefore, the only thing we can offer her is to repeat an injection with the hopes that injection from early October had some technical difficulties.    A new order for an injection into the left ankle will be placed in the computer.  The injection is for a diagnosis of arthritis and it will be done under fluoroscopic control with lidocaine and Kenalog.    All questions were answered.  The patient will follow up on a p.r.n. basis.    TT:  20 minutes.  CT:  15 minutes.

## 2022-11-01 NOTE — LETTER
11/1/2022         RE: Kady Benson  [address] [address]        Dear Colleague,    Thank you for referring your patient, Kady Benson, to the [facility] [place]. Please see a copy of my visit note below.    CHIEF COMPLAINT:    1.  Left ankle arthritis status post left ankle arthroscopic debridement in 2014.  2.  Status post left second and third metatarsal shortening osteotomies.    HISTORY OF PRESENT ILLNESS:  Ms. Benson was interviewed today via phone secondary to the pandemic.  The patient authorized the encounter.    The patient reports to have still pain and discomfort in her ankle after an injection that she underwent on 10/07/2022.  The patient is also leaving for [place] for wintertime within the next week and a half.    I discussed with her that at this point, we do not have time to provide her with a brace that will be effective in alleviating her symptoms.  Therefore, the only thing we can offer her is to repeat an injection with the hopes that injection from early October had some technical difficulties.    A new order for an injection into the left ankle will be placed in the computer.  The injection is for a diagnosis of arthritis and it will be done under fluoroscopic control with lidocaine and Kenalog.    All questions were answered.  The patient will follow up on a p.r.n. basis.    TT:  20 minutes.  CT:  15 minutes.      Christopher Peres MD

## 2022-11-02 ENCOUNTER — DOCUMENTATION ONLY (OUTPATIENT)
Dept: ORTHOPEDICS | Facility: CLINIC | Age: 57
End: 2022-11-02

## 2023-03-26 ENCOUNTER — HEALTH MAINTENANCE LETTER (OUTPATIENT)
Age: 58
End: 2023-03-26

## 2023-05-22 ENCOUNTER — TRANSFERRED RECORDS (OUTPATIENT)
Dept: MULTI SPECIALTY CLINIC | Facility: CLINIC | Age: 58
End: 2023-05-22

## 2023-05-22 LAB
ALT SERPL-CCNC: 19 U/L (ref 6–29)
AST SERPL-CCNC: 27 U/L (ref 10–25)
CREATININE (EXTERNAL): 0.74 MG/DL (ref 0.5–1.03)
GFR ESTIMATED (EXTERNAL): 93 ML/MIN/1.73M2

## 2023-05-27 ENCOUNTER — HEALTH MAINTENANCE LETTER (OUTPATIENT)
Age: 58
End: 2023-05-27

## 2023-06-06 ENCOUNTER — TELEPHONE (OUTPATIENT)
Dept: ORTHOPEDICS | Facility: CLINIC | Age: 58
End: 2023-06-06
Payer: COMMERCIAL

## 2023-06-13 ENCOUNTER — OFFICE VISIT (OUTPATIENT)
Dept: ORTHOPEDICS | Facility: CLINIC | Age: 58
End: 2023-06-13
Payer: COMMERCIAL

## 2023-06-13 DIAGNOSIS — M25.572 PAIN IN JOINT, ANKLE AND FOOT, LEFT: Primary | ICD-10-CM

## 2023-06-13 PROCEDURE — 99212 OFFICE O/P EST SF 10 MIN: CPT | Performed by: ORTHOPAEDIC SURGERY

## 2023-06-13 NOTE — LETTER
6/13/2023         RE: Kady Benson  [address] [address]        Dear Colleague,    Thank you for referring your patient, Kady Benson, to the [facility] [place]. Please see a copy of my visit note below.    Chief complaint left ankle pain      Kady Benson presents today for further evaluation.  Reports to have problems and difficulties with the left ankle.  Reports to have some burning on the medial aspect of the ankle joint.  She also reports to have some aggravation of her left hip and knee.    On today's exam she presents with full range of motion of the left ankle hindfoot and midfoot joint CMS intact skin is intact.  There is a fair amount of pain with palpation of the posterior tibialis tendon.  Otherwise exam is fairly unremarkable.  Forefoot exam is unremarkable    Assessment left ankle pain possible posterior tibialis tendinitis versus tendinosis.    Plan I discussed with the patient that at this point organ to proceed with an MRI of the left ankle to assess the quality of the posterior tibialis tendon.  Based on those findings further recommendation will be given to the patient which will range between tendon sheath injection versus a surgical reconstruction    In the meantime she has no restrictions.  All questions were answered.    TT 20 minutes      Christopher Peres MD

## 2023-06-13 NOTE — NURSING NOTE
Reason For Visit:   Chief Complaint   Patient presents with     RECHECK     Return for left foot, ankle, knee and hip pain. Patient states that they have had the pain for a few years and last saw Dr. Peres for it on 11/01/2022. They state that the hip pain is new. They saw Dr. Billings for their knee and tried physical therapy, but that did not help. Patient did not have the injection in their ankle done that was ordered at the last visit, they state that they do not expect that to help.       There were no vitals taken for this visit.    Pain Assessment  Patient Currently in Pain: Yes  0-10 Pain Scale: 6  Primary Pain Location: Ankle (Left)    Piyush Maurer, EMT

## 2023-06-13 NOTE — PROGRESS NOTES
Chief complaint left ankle pain      Kady Benson presents today for further evaluation.  Reports to have problems and difficulties with the left ankle.  Reports to have some burning on the medial aspect of the ankle joint.  She also reports to have some aggravation of her left hip and knee.    On today's exam she presents with full range of motion of the left ankle hindfoot and midfoot joint CMS intact skin is intact.  There is a fair amount of pain with palpation of the posterior tibialis tendon.  Otherwise exam is fairly unremarkable.  Forefoot exam is unremarkable    Assessment left ankle pain possible posterior tibialis tendinitis versus tendinosis.    Plan I discussed with the patient that at this point organ to proceed with an MRI of the left ankle to assess the quality of the posterior tibialis tendon.  Based on those findings further recommendation will be given to the patient which will range between tendon sheath injection versus a surgical reconstruction    In the meantime she has no restrictions.  All questions were answered.    TT 20 minutes

## 2023-06-15 ENCOUNTER — TRANSFERRED RECORDS (OUTPATIENT)
Dept: MULTI SPECIALTY CLINIC | Facility: CLINIC | Age: 58
End: 2023-06-15

## 2023-06-15 LAB
CREATININE (EXTERNAL): 0.75 MG/DL (ref 0.5–1.05)
GFR ESTIMATED (EXTERNAL): 91 ML/MIN/1.73M2
GLUCOSE (EXTERNAL): 55 MG/DL (ref 65–99)
POTASSIUM (EXTERNAL): 4.4 MMOL/L (ref 3.5–5.3)

## 2023-06-16 ASSESSMENT — ENCOUNTER SYMPTOMS
JOINT SWELLING: 1
EYE IRRITATION: 1
SMELL DISTURBANCE: 0
STIFFNESS: 1
MYALGIAS: 1
EYE PAIN: 0
NECK PAIN: 0
MUSCLE WEAKNESS: 0
HOARSE VOICE: 0
BACK PAIN: 0
SORE THROAT: 1
SINUS CONGESTION: 1
MUSCLE CRAMPS: 1
SINUS PAIN: 1
DOUBLE VISION: 0
EYE REDNESS: 1
TASTE DISTURBANCE: 0
NECK MASS: 0
TROUBLE SWALLOWING: 0
EYE WATERING: 0
ARTHRALGIAS: 1

## 2023-06-19 ENCOUNTER — OFFICE VISIT (OUTPATIENT)
Dept: NEUROSURGERY | Facility: CLINIC | Age: 58
End: 2023-06-19
Payer: COMMERCIAL

## 2023-06-19 ENCOUNTER — PRE VISIT (OUTPATIENT)
Dept: NEUROSURGERY | Facility: CLINIC | Age: 58
End: 2023-06-19

## 2023-06-19 VITALS
HEART RATE: 61 BPM | SYSTOLIC BLOOD PRESSURE: 117 MMHG | OXYGEN SATURATION: 95 % | DIASTOLIC BLOOD PRESSURE: 82 MMHG | HEIGHT: 67 IN | BODY MASS INDEX: 30.85 KG/M2

## 2023-06-19 DIAGNOSIS — M15.0 PRIMARY OSTEOARTHRITIS INVOLVING MULTIPLE JOINTS: ICD-10-CM

## 2023-06-19 DIAGNOSIS — M06.9 RHEUMATOID ARTHRITIS INVOLVING MULTIPLE SITES, UNSPECIFIED WHETHER RHEUMATOID FACTOR PRESENT (H): Primary | ICD-10-CM

## 2023-06-19 PROCEDURE — 99207 PR NO CHARGE-RESEARCH SERVICE: CPT | Performed by: NEUROLOGICAL SURGERY

## 2023-06-19 ASSESSMENT — PAIN SCALES - GENERAL: PAINLEVEL: MILD PAIN (3)

## 2023-06-19 NOTE — LETTER
6/19/2023       RE: Kady Benson  [address] [facility] [address]     Dear Colleague,    Thank you for referring your patient, Kady Benson, to the [facility] NEUROSURGERY [facility] [place] at [facility]. Please see a copy of my visit note below.    NEUROSURGERY    HISTORY AND PHYSICAL EXAM      Chief Complaint   Patient presents with    Arthritis     Rheumatoid arthritis, RESET-RA clinical trial.  VNS implantation referral.       HISTORY OF PRESENT ILLNESS  Ms. Kady Benson (pronounced Poorfliet) is a 58 year old female who is referred by Dr. Luna for vagus nerve stimulator implantation for rheumatoid arthritis clinical trial.  Dr. Luna has screened the patient and she has met all the necessary criteria for the clinical trial.  Patient was screened last week.  She is scheduled to have her MRI of her hands done this week, as well as the x-ray of her neck.  She had her preoperative labs and EKG done.  Dr. Luna felt that she would be a good candidate for the VNS implantation clinical trial.      Patient states that she is in her usual state of health.  Patient denies any issues with previous infections or open/non-healing wounds.  She is also not on any antiplatelet or anticoagulation therapy.  She does not take aspirin.      Past Medical History:   Diagnosis Date    Angle recession glaucoma of left eye     Arthropathy     multiple sites    Depression     Glaucoma suspect     RA (rheumatoid arthritis) (H) 1/4/2012       Past Surgical History:   Procedure Laterality Date    APPENDECTOMY      ARTHROPLASTY KNEE  11/29/2012    Procedure: ARTHROPLASTY KNEE;  Right Total Knee Arthroplasty,  Left Knee Steroid Injection   ;  Surgeon: Florence Billings MD;  [facility]    ARTHROSCOPY ANKLE  3/26/2014    Procedure: ARTHROSCOPY ANKLE;;  Surgeon: Christopher Peres MD;  [facility]    ARTHROTOMY FOOT  3/26/2014    Procedure: ARTHROTOMY  FOOT;  Right 1st Metatarsal Phalangeal Joint Debridement, Left Ankle Arthroscopy & Debridement, Left 2nd And 3rd Metatarsal Shortening, Left 2nd And 3rd Hammer Toe Correction Surgery   ;  Surgeon: Christopher Peres MD;  [facility]    GYN SURGERY      INJECT STEROID (LOCATION)  11/29/2012    Procedure: INJECT STEROID (LOCATION);;  Surgeon: Florence Billings MD;  [facility]    MEDIASTINOSCOPY N/A 8/20/2014    Procedure: MEDIASTINOSCOPY;  Surgeon: Christiano Hernandez MD;  [facility]    ORTHOPEDIC SURGERY      REPAIR HAMMER TOE  3/26/2014    Procedure: REPAIR HAMMER TOE;;  Surgeon: Christopher Peres MD;  [facility]       Family History   Problem Relation Age of Onset    Uterine Cancer Mother     Skin Cancer Father     Heart Disease Maternal Grandmother     Heart Disease Paternal Grandmother     Prostate Cancer Paternal Grandfather     Stomach Cancer Paternal Grandfather        Social History     Socioeconomic History    Marital status:      Spouse name: Not on file    Number of children: Not on file    Years of education: Not on file    Highest education level: Not on file   Occupational History    Not on file   Tobacco Use    Smoking status: Never    Smokeless tobacco: Never   Vaping Use    Vaping status: Not on file   Substance and Sexual Activity    Alcohol use: Yes     Comment: 1 a day    Drug use: No    Sexual activity: Not on file   Other Topics Concern    Not on file   Social History Narrative    Not on file     Social Determinants of Health     Financial Resource Strain: Not on file   Food Insecurity: Not on file   Transportation Needs: Not on file   Physical Activity: Not on file   Stress: Not on file   Social Connections: Not on file   Intimate Partner Violence: Not on file   Housing Stability: Not on file       Allergies   Allergen Reactions    Duloxetine Other (See Comments)     Psychosis in combo w/ Prednisone.      Shellfish Allergy Anaphylaxis    Prednisone  Other (See Comments)    Iodine-131 Other (See Comments)    Sulfasalazine Rash    Vancomycin Rash    Wellbutrin [Bupropion] Rash       Current Outpatient Medications   Medication    acetaminophen (TYLENOL ARTHRITIS PAIN) 650 MG CR tablet    amoxicillin (AMOXIL) 500 MG tablet    Artificial Tear Solution (HYPOTEARS PF OP)    azaTHIOprine (IMURAN) 25 mg TABS half-tab    CALCIUM PO    MAGNESIUM PO    Milk Thistle 175 MG TABS    Multiple Vitamin (MULTIVITAMIN) per tablet    naproxen sodium (ALEVE) 220 MG capsule    ORENCIA CLICKJECT 125 MG/ML SOAJ auto-injector    glucosamine-chondroitin 500-400 MG CAPS     No current facility-administered medications for this visit.       REVIEW OF SYSTEMS:  Answers for HPI/ROS submitted by the patient on 6/16/2023  General Symptoms: No  Skin Symptoms: No  HENT Symptoms: Yes  EYE SYMPTOMS: Yes  HEART SYMPTOMS: No  LUNG SYMPTOMS: No  INTESTINAL SYMPTOMS: No  URINARY SYMPTOMS: No  GYNECOLOGIC SYMPTOMS: No  BREAST SYMPTOMS: No  SKELETAL SYMPTOMS: Yes  BLOOD SYMPTOMS: No  NERVOUS SYSTEM SYMPTOMS: No  MENTAL HEALTH SYMPTOMS: No  Ear pain: No  Ear discharge: No  Hearing loss: No  Tinnitus: No  Nosebleeds: No  Congestion: Yes  Sinus pain: Yes  Trouble swallowing: No   Voice hoarseness: No  Mouth sores: No  Sore throat: Yes  Tooth pain: No  Gum tenderness: No  Bleeding gums: No  Change in taste: No  Change in sense of smell: No  Dry mouth: No  Hearing aid used: No  Neck lump: No  Eye pain: No  Vision loss: No  Dry eyes: Yes  Watery eyes: No  Eye bulging: No  Double vision: No  Flashing of lights: No  Spots: No  Floaters: No  Redness: Yes  Crossed eyes: No  Tunnel Vision: No  Yellowing of eyes: No  Eye irritation: Yes  Back pain: No  Muscle aches: Yes  Neck pain: No  Swollen joints: Yes  Joint pain: Yes  Bone pain: No  Muscle cramps: Yes  Muscle weakness: No  Joint stiffness: Yes  Bone fracture: No  HISTORY OF SARCOIDOSIS - biopsy of the lymph node at the lower neck/upper chest      PHYSICAL  "EXAM  /82   Pulse 61   Ht 1.702 m (5' 7\")   SpO2 95%   BMI 30.85 kg/m      General: Awake, alert, oriented. Well nourished, well developed, no acute distress.  HEENT: Head normocephalic, atraumatic. No carotid bruit. Neck supple. Good range of motion. No palpable thyroid mass.  Heart: Regular rhythm and rate. No murmurs.  Lungs: Clear to auscultation and percussion bilaterally. No rhonchi, rales or wheeze.  Abdomen: Soft, non-tender, non-distended. No hepatosplenomegaly.  Extremity: Warm with no clubbing or cyanosis. No lower extremity edema.  Incision: linear horizontal incision at the top of her sternum.  Well healed.  Bilateral knee incisions are well healed.    Neurological:  Awake, alert and oriented to date, time, place and person. Speech fluent.   Pupils equal, round, reactive to light.  Extraocular movement intact.  Visual fields are full on confrontation.  Hearing is grossly normal to finger rub.   Facial sensation intact.  Face symmetric.  Tongue midline.  Uvula elevates equally.    Motor: full strength throughout.  Sensation: intact to light touch and pinpoint.  Deep tendon reflexes: trace throughout. Negative for clonus. Negative for Hassan's sign. No dysmetria.      ASSESSMENT   58 year old female  Rheumatoid arthritis    Patient presents for consideration of vagus nerve stimulator (VNS) placement for her rheumatoid arthritis, as part of the SPM-020 RESET-RA clinical trial.    We discussed the surgical procedure in detail.  The surgery is performed under general anesthesia and it is a same day procedure.  Horizontal incision is made in the neck, typically utilizing a natural skin crease, and platysma muscle is dissected to reach the sternocleidomastoid muscle.  We utilize a surgical corridor anterior to this muscle and typically use blunt and sharp dissection technique to isolate the vagus nerve.  For this clinical trial, VNS stimulator is placed next to the nerve and soft casing is placed " around the nerve and the stimulator.  Risks, benefits and alternative therapies were discussed including but not limited to bleeding, infection, injury to the surrounding structures, difficulty swallowing, change in voice, injury to the vagus nerve, need for more surgery, and device failure.  All questions were answered and the patient expressed understanding.    We also discussed the issues related to the device implantation itself.  The device is implanted next to or adjacent to the vagus nerve with the stimulating surface touching the vagus nerve.  It is encased in a soft case.  Therefore, if necessary, the device can be explanted.  The device is also MRI conditional.     Patient has met the criteria for the study and has undergone all the necessary testing as part of the study so far.  She does have MRI of her hands and cervical spine x-ray that is pending.  She does have an MRI of her cervical spine from 7/31/2014 which did show multilevel DDD with mild spinal canal narrowing.  We will need to see the x-ray cervical spine to rule out any spinal instability or any other contraindication for surgery.  She will have a 30 day window from the time of the enrollment.  Therefore, we are planning her surgery for 6/29/2023, pending remainder of the workup.      PLAN  1.  Surgery order.  2.  History and physical exam has been performed.  3.  Preop labs - done and have resulted.      25 minutes were spent face to face with the patient of which more than 50% of the time was spent counseling and discussing the above issues regarding diagnosis, differential, treatment options, and steps for further evaluation.  5 minutes were spent reviewing patient's chart, imaging in PACS.  20 minutes were spent on documentation for this encounter.  50 minutes total were spent on this encounter today.    Sincerely,    Enoc Simms MD

## 2023-06-19 NOTE — PROGRESS NOTES
NEUROSURGERY    HISTORY AND PHYSICAL EXAM      Chief Complaint   Patient presents with     Arthritis     Rheumatoid arthritis, RESET-RA clinical trial.  VNS implantation referral.       HISTORY OF PRESENT ILLNESS  Ms. Kady Benson (pronounced Poorfliet) is a 58 year old female who is referred by Dr. Luna for vagus nerve stimulator implantation for rheumatoid arthritis clinical trial.  Dr. Luna has screened the patient and she has met all the necessary criteria for the clinical trial.  Patient was screened last week.  She is scheduled to have her MRI of her hands done this week, as well as the x-ray of her neck.  She had her preoperative labs and EKG done.  Dr. Luna felt that she would be a good candidate for the VNS implantation clinical trial.      Patient states that she is in her usual state of health.  Patient denies any issues with previous infections or open/non-healing wounds.  She is also not on any antiplatelet or anticoagulation therapy.  She does not take aspirin.      Past Medical History:   Diagnosis Date     Angle recession glaucoma of left eye      Arthropathy     multiple sites     Depression      Glaucoma suspect      RA (rheumatoid arthritis) (H) 1/4/2012       Past Surgical History:   Procedure Laterality Date     APPENDECTOMY       ARTHROPLASTY KNEE  11/29/2012    Procedure: ARTHROPLASTY KNEE;  Right Total Knee Arthroplasty,  Left Knee Steroid Injection   ;  Surgeon: Florence Billings MD;  [facility]     ARTHROSCOPY ANKLE  3/26/2014    Procedure: ARTHROSCOPY ANKLE;;  Surgeon: Christopher Peres MD;  [facility]     ARTHROTOMY FOOT  3/26/2014    Procedure: ARTHROTOMY FOOT;  Right 1st Metatarsal Phalangeal Joint Debridement, Left Ankle Arthroscopy & Debridement, Left 2nd And 3rd Metatarsal Shortening, Left 2nd And 3rd Hammer Toe Correction Surgery   ;  Surgeon: Christopher Peres MD;  [facility]     GYN SURGERY       INJECT STEROID (LOCATION)  11/29/2012     Procedure: INJECT STEROID (LOCATION);;  Surgeon: Florence Billings MD;  [facility]     MEDIASTINOSCOPY N/A 8/20/2014    Procedure: MEDIASTINOSCOPY;  Surgeon: Christiano Hernandez MD;  [facility]     ORTHOPEDIC SURGERY       REPAIR HAMMER TOE  3/26/2014    Procedure: REPAIR HAMMER TOE;;  Surgeon: Christopher Peres MD;  [facility]       Family History   Problem Relation Age of Onset     Uterine Cancer Mother      Skin Cancer Father      Heart Disease Maternal Grandmother      Heart Disease Paternal Grandmother      Prostate Cancer Paternal Grandfather      Stomach Cancer Paternal Grandfather        Social History     Socioeconomic History     Marital status:      Spouse name: Not on file     Number of children: Not on file     Years of education: Not on file     Highest education level: Not on file   Occupational History     Not on file   Tobacco Use     Smoking status: Never     Smokeless tobacco: Never   Vaping Use     Vaping status: Not on file   Substance and Sexual Activity     Alcohol use: Yes     Comment: 1 a day     Drug use: No     Sexual activity: Not on file   Other Topics Concern     Not on file   Social History Narrative     Not on file     Social Determinants of Health     Financial Resource Strain: Not on file   Food Insecurity: Not on file   Transportation Needs: Not on file   Physical Activity: Not on file   Stress: Not on file   Social Connections: Not on file   Intimate Partner Violence: Not on file   Housing Stability: Not on file       Allergies   Allergen Reactions     Duloxetine Other (See Comments)     Psychosis in combo w/ Prednisone.       Shellfish Allergy Anaphylaxis     Prednisone Other (See Comments)     Iodine-131 Other (See Comments)     Sulfasalazine Rash     Vancomycin Rash     Wellbutrin [Bupropion] Rash       Current Outpatient Medications   Medication     acetaminophen (TYLENOL ARTHRITIS PAIN) 650 MG CR tablet     amoxicillin (AMOXIL) 500 MG  "tablet     Artificial Tear Solution (HYPOTEARS PF OP)     azaTHIOprine (IMURAN) 25 mg TABS half-tab     CALCIUM PO     MAGNESIUM PO     Milk Thistle 175 MG TABS     Multiple Vitamin (MULTIVITAMIN) per tablet     naproxen sodium (ALEVE) 220 MG capsule     ORENCIA CLICKJECT 125 MG/ML SOAJ auto-injector     glucosamine-chondroitin 500-400 MG CAPS     No current facility-administered medications for this visit.       REVIEW OF SYSTEMS:  Answers for HPI/ROS submitted by the patient on 6/16/2023  General Symptoms: No  Skin Symptoms: No  HENT Symptoms: Yes  EYE SYMPTOMS: Yes  HEART SYMPTOMS: No  LUNG SYMPTOMS: No  INTESTINAL SYMPTOMS: No  URINARY SYMPTOMS: No  GYNECOLOGIC SYMPTOMS: No  BREAST SYMPTOMS: No  SKELETAL SYMPTOMS: Yes  BLOOD SYMPTOMS: No  NERVOUS SYSTEM SYMPTOMS: No  MENTAL HEALTH SYMPTOMS: No  Ear pain: No  Ear discharge: No  Hearing loss: No  Tinnitus: No  Nosebleeds: No  Congestion: Yes  Sinus pain: Yes  Trouble swallowing: No   Voice hoarseness: No  Mouth sores: No  Sore throat: Yes  Tooth pain: No  Gum tenderness: No  Bleeding gums: No  Change in taste: No  Change in sense of smell: No  Dry mouth: No  Hearing aid used: No  Neck lump: No  Eye pain: No  Vision loss: No  Dry eyes: Yes  Watery eyes: No  Eye bulging: No  Double vision: No  Flashing of lights: No  Spots: No  Floaters: No  Redness: Yes  Crossed eyes: No  Tunnel Vision: No  Yellowing of eyes: No  Eye irritation: Yes  Back pain: No  Muscle aches: Yes  Neck pain: No  Swollen joints: Yes  Joint pain: Yes  Bone pain: No  Muscle cramps: Yes  Muscle weakness: No  Joint stiffness: Yes  Bone fracture: No  HISTORY OF SARCOIDOSIS - biopsy of the lymph node at the lower neck/upper chest      PHYSICAL EXAM  /82   Pulse 61   Ht 1.702 m (5' 7\")   SpO2 95%   BMI 30.85 kg/m      General: Awake, alert, oriented. Well nourished, well developed, no acute distress.  HEENT: Head normocephalic, atraumatic. No carotid bruit. Neck supple. Good range of motion. " No palpable thyroid mass.  Heart: Regular rhythm and rate. No murmurs.  Lungs: Clear to auscultation and percussion bilaterally. No rhonchi, rales or wheeze.  Abdomen: Soft, non-tender, non-distended. No hepatosplenomegaly.  Extremity: Warm with no clubbing or cyanosis. No lower extremity edema.  Incision: linear horizontal incision at the top of her sternum.  Well healed.  Bilateral knee incisions are well healed.    Neurological:  Awake, alert and oriented to date, time, place and person. Speech fluent.   Pupils equal, round, reactive to light.  Extraocular movement intact.  Visual fields are full on confrontation.  Hearing is grossly normal to finger rub.   Facial sensation intact.  Face symmetric.  Tongue midline.  Uvula elevates equally.    Motor: full strength throughout.  Sensation: intact to light touch and pinpoint.  Deep tendon reflexes: trace throughout. Negative for clonus. Negative for Hassan's sign. No dysmetria.      ASSESSMENT   58 year old female  Rheumatoid arthritis    Patient presents for consideration of vagus nerve stimulator (VNS) placement for her rheumatoid arthritis, as part of the SPM-020 RESET-RA clinical trial.    We discussed the surgical procedure in detail.  The surgery is performed under general anesthesia and it is a same day procedure.  Horizontal incision is made in the neck, typically utilizing a natural skin crease, and platysma muscle is dissected to reach the sternocleidomastoid muscle.  We utilize a surgical corridor anterior to this muscle and typically use blunt and sharp dissection technique to isolate the vagus nerve.  For this clinical trial, VNS stimulator is placed next to the nerve and soft casing is placed around the nerve and the stimulator.  Risks, benefits and alternative therapies were discussed including but not limited to bleeding, infection, injury to the surrounding structures, difficulty swallowing, change in voice, injury to the vagus nerve, need for more  surgery, and device failure.  All questions were answered and the patient expressed understanding.    We also discussed the issues related to the device implantation itself.  The device is implanted next to or adjacent to the vagus nerve with the stimulating surface touching the vagus nerve.  It is encased in a soft case.  Therefore, if necessary, the device can be explanted.  The device is also MRI conditional.     Patient has met the criteria for the study and has undergone all the necessary testing as part of the study so far.  She does have MRI of her hands and cervical spine x-ray that is pending.  She does have an MRI of her cervical spine from 7/31/2014 which did show multilevel DDD with mild spinal canal narrowing.  We will need to see the x-ray cervical spine to rule out any spinal instability or any other contraindication for surgery.  She will have a 30 day window from the time of the enrollment.  Therefore, we are planning her surgery for 6/29/2023, pending remainder of the workup.      PLAN  1.  Surgery order.  2.  History and physical exam has been performed.  3.  Preop labs - done and have resulted.      25 minutes were spent face to face with the patient of which more than 50% of the time was spent counseling and discussing the above issues regarding diagnosis, differential, treatment options, and steps for further evaluation.  5 minutes were spent reviewing patient's chart, imaging in PACS.  20 minutes were spent on documentation for this encounter.  50 minutes total were spent on this encounter today.

## 2023-06-20 NOTE — TELEPHONE ENCOUNTER
I called patient in regards to surgery with Dr. Simms. I was unable to reach patient, but a voicemail and a call back number were left on patients voicemail.        Shital Elmore on 6/20/2023 at 12:25 PM

## 2023-06-28 ENCOUNTER — TELEPHONE (OUTPATIENT)
Dept: NEUROSURGERY | Facility: CLINIC | Age: 58
End: 2023-06-28
Payer: COMMERCIAL

## 2023-06-28 NOTE — TELEPHONE ENCOUNTER
DIAGNOSIS: L foot, ankle, knee and hip pain.  seeing Peres for foot/ankle, scheduled with Arendt for knee (in Sep); perhaps all related (?)   APPOINTMENT DATE: 07/03/23   NOTES STATUS DETAILS   OFFICE NOTE from other specialist Internal 11/01/22-Peres   MEDICATION LIST Internal    MRI Internal MR Cervical Spine-Peres-07/31/14   CT SCAN Internal CT L Ankle-Peres-01/20/21   XRAYS (IMAGES & REPORTS) Internal XR Joint Inj. L Ankle-Peres-10/07/22    XR L Ankle-Peres-09/20/23, 01/19/21    XR L Knee-Arendt-01/19/21    XR BL Knee-Arendt-10/04/16    XR Pelvis+Hip-Thiago-03/10/16    XR Lumbar Rrtnu-Rjmei-81/07/15    XR L Foot-Peres-09/23/14, 07/22/14, 05/06/14    XR Cervical Spine-Janae-08/04/14    XR Joint Inj. Intermed. L-Peres-11/01/22,10/07/22    HC XR Knee-Arendt-02/05/13,07/10/12,05/29/12

## 2023-06-28 NOTE — TELEPHONE ENCOUNTER
Returned pt's call. I let her know that Dr. Simms had received and reviewed the VNS trial imaging and there are no contraindications to surgery on 6/29. Pt should arrive on [address] at [address] [place] [address] at 10:00 am for 12:10 pm surgery. No further questions at this time.

## 2023-06-29 ENCOUNTER — ANESTHESIA EVENT (OUTPATIENT)
Dept: SURGERY | Facility: CLINIC | Age: 58
End: 2023-06-29

## 2023-06-29 ENCOUNTER — ANCILLARY PROCEDURE (OUTPATIENT)
Dept: MRI IMAGING | Facility: CLINIC | Age: 58
End: 2023-06-29
Attending: ORTHOPAEDIC SURGERY
Payer: COMMERCIAL

## 2023-06-29 ENCOUNTER — ANESTHESIA (OUTPATIENT)
Dept: SURGERY | Facility: CLINIC | Age: 58
End: 2023-06-29

## 2023-06-29 ENCOUNTER — HOSPITAL ENCOUNTER (OUTPATIENT)
Facility: CLINIC | Age: 58
Discharge: HOME | End: 2023-06-29
Attending: NEUROLOGICAL SURGERY | Admitting: NEUROLOGICAL SURGERY

## 2023-06-29 ENCOUNTER — APPOINTMENT (OUTPATIENT)
Dept: GENERAL RADIOLOGY | Facility: CLINIC | Age: 58
End: 2023-06-29
Attending: NEUROLOGICAL SURGERY

## 2023-06-29 VITALS
SYSTOLIC BLOOD PRESSURE: 134 MMHG | OXYGEN SATURATION: 99 % | HEIGHT: 66 IN | RESPIRATION RATE: 16 BRPM | TEMPERATURE: 97.9 F | HEART RATE: 60 BPM | WEIGHT: 196.65 LBS | BODY MASS INDEX: 31.6 KG/M2 | DIASTOLIC BLOOD PRESSURE: 80 MMHG

## 2023-06-29 DIAGNOSIS — M06.9 RHEUMATOID ARTHRITIS INVOLVING MULTIPLE SITES, UNSPECIFIED WHETHER RHEUMATOID FACTOR PRESENT (H): Primary | ICD-10-CM

## 2023-06-29 DIAGNOSIS — M25.572 PAIN IN JOINT, ANKLE AND FOOT, LEFT: ICD-10-CM

## 2023-06-29 DIAGNOSIS — Z96.89 STATUS POST PLACEMENT OF VNS (VAGUS NERVE STIMULATION) DEVICE: ICD-10-CM

## 2023-06-29 PROCEDURE — 999N000063 XR NECK SOFT TISSUE

## 2023-06-29 PROCEDURE — 250N000009 HC RX 250: Performed by: NURSE ANESTHETIST, CERTIFIED REGISTERED

## 2023-06-29 PROCEDURE — 999N000141 HC STATISTIC PRE-PROCEDURE NURSING ASSESSMENT: Performed by: NEUROLOGICAL SURGERY

## 2023-06-29 PROCEDURE — 710N000010 HC RECOVERY PHASE 1, LEVEL 2, PER MIN: Performed by: NEUROLOGICAL SURGERY

## 2023-06-29 PROCEDURE — 278N000051 HC OR IMPLANT GENERAL: Performed by: NEUROLOGICAL SURGERY

## 2023-06-29 PROCEDURE — 250N000011 HC RX IP 250 OP 636: Performed by: NURSE ANESTHETIST, CERTIFIED REGISTERED

## 2023-06-29 PROCEDURE — 272N000001 HC OR GENERAL SUPPLY STERILE: Performed by: NEUROLOGICAL SURGERY

## 2023-06-29 PROCEDURE — 64568 OPN IMPLTJ CRNL NRV NEA&PG: CPT | Performed by: NEUROLOGICAL SURGERY

## 2023-06-29 PROCEDURE — 258N000003 HC RX IP 258 OP 636: Performed by: NURSE ANESTHETIST, CERTIFIED REGISTERED

## 2023-06-29 PROCEDURE — 250N000011 HC RX IP 250 OP 636: Mod: JZ | Performed by: NURSE ANESTHETIST, CERTIFIED REGISTERED

## 2023-06-29 PROCEDURE — 70360 X-RAY EXAM OF NECK: CPT | Mod: 26 | Performed by: STUDENT IN AN ORGANIZED HEALTH CARE EDUCATION/TRAINING PROGRAM

## 2023-06-29 PROCEDURE — 250N000025 HC SEVOFLURANE, PER MIN: Performed by: NEUROLOGICAL SURGERY

## 2023-06-29 PROCEDURE — 710N000012 HC RECOVERY PHASE 2, PER MINUTE: Performed by: NEUROLOGICAL SURGERY

## 2023-06-29 PROCEDURE — 250N000011 HC RX IP 250 OP 636: Performed by: NEUROLOGICAL SURGERY

## 2023-06-29 PROCEDURE — 73721 MRI JNT OF LWR EXTRE W/O DYE: CPT | Mod: LT | Performed by: RADIOLOGY

## 2023-06-29 PROCEDURE — 250N000009 HC RX 250: Performed by: NEUROLOGICAL SURGERY

## 2023-06-29 PROCEDURE — 360N000076 HC SURGERY LEVEL 3, PER MIN: Performed by: NEUROLOGICAL SURGERY

## 2023-06-29 PROCEDURE — 370N000017 HC ANESTHESIA TECHNICAL FEE, PER MIN: Performed by: NEUROLOGICAL SURGERY

## 2023-06-29 RX ORDER — SODIUM CHLORIDE, SODIUM LACTATE, POTASSIUM CHLORIDE, CALCIUM CHLORIDE 600; 310; 30; 20 MG/100ML; MG/100ML; MG/100ML; MG/100ML
INJECTION, SOLUTION INTRAVENOUS CONTINUOUS
Status: DISCONTINUED | OUTPATIENT
Start: 2023-06-29 | End: 2023-06-29 | Stop reason: HOSPADM

## 2023-06-29 RX ORDER — OXYCODONE HYDROCHLORIDE 10 MG/1
10 TABLET ORAL
Status: DISCONTINUED | OUTPATIENT
Start: 2023-06-29 | End: 2023-06-29 | Stop reason: HOSPADM

## 2023-06-29 RX ORDER — ONDANSETRON 4 MG/1
4 TABLET, ORALLY DISINTEGRATING ORAL EVERY 30 MIN PRN
Status: DISCONTINUED | OUTPATIENT
Start: 2023-06-29 | End: 2023-06-29 | Stop reason: HOSPADM

## 2023-06-29 RX ORDER — FENTANYL CITRATE 50 UG/ML
25 INJECTION, SOLUTION INTRAMUSCULAR; INTRAVENOUS EVERY 5 MIN PRN
Status: DISCONTINUED | OUTPATIENT
Start: 2023-06-29 | End: 2023-06-29 | Stop reason: HOSPADM

## 2023-06-29 RX ORDER — FENTANYL CITRATE 50 UG/ML
50 INJECTION, SOLUTION INTRAMUSCULAR; INTRAVENOUS EVERY 5 MIN PRN
Status: DISCONTINUED | OUTPATIENT
Start: 2023-06-29 | End: 2023-06-29 | Stop reason: HOSPADM

## 2023-06-29 RX ORDER — CEFAZOLIN SODIUM/WATER 2 G/20 ML
2 SYRINGE (ML) INTRAVENOUS
Status: COMPLETED | OUTPATIENT
Start: 2023-06-29 | End: 2023-06-29

## 2023-06-29 RX ORDER — PROPOFOL 10 MG/ML
INJECTION, EMULSION INTRAVENOUS PRN
Status: DISCONTINUED | OUTPATIENT
Start: 2023-06-29 | End: 2023-06-29

## 2023-06-29 RX ORDER — HYDROMORPHONE HCL IN WATER/PF 6 MG/30 ML
0.4 PATIENT CONTROLLED ANALGESIA SYRINGE INTRAVENOUS EVERY 5 MIN PRN
Status: DISCONTINUED | OUTPATIENT
Start: 2023-06-29 | End: 2023-06-29 | Stop reason: HOSPADM

## 2023-06-29 RX ORDER — HYDROMORPHONE HCL IN WATER/PF 6 MG/30 ML
0.2 PATIENT CONTROLLED ANALGESIA SYRINGE INTRAVENOUS EVERY 5 MIN PRN
Status: DISCONTINUED | OUTPATIENT
Start: 2023-06-29 | End: 2023-06-29 | Stop reason: HOSPADM

## 2023-06-29 RX ORDER — CEPHALEXIN 500 MG/1
500 CAPSULE ORAL 4 TIMES DAILY
Qty: 28 CAPSULE | Refills: 0 | Status: SHIPPED | OUTPATIENT
Start: 2023-06-29 | End: 2023-07-06

## 2023-06-29 RX ORDER — ONDANSETRON 2 MG/ML
4 INJECTION INTRAMUSCULAR; INTRAVENOUS EVERY 30 MIN PRN
Status: DISCONTINUED | OUTPATIENT
Start: 2023-06-29 | End: 2023-06-29 | Stop reason: HOSPADM

## 2023-06-29 RX ORDER — SODIUM CHLORIDE, SODIUM LACTATE, POTASSIUM CHLORIDE, CALCIUM CHLORIDE 600; 310; 30; 20 MG/100ML; MG/100ML; MG/100ML; MG/100ML
INJECTION, SOLUTION INTRAVENOUS CONTINUOUS PRN
Status: DISCONTINUED | OUTPATIENT
Start: 2023-06-29 | End: 2023-06-29

## 2023-06-29 RX ORDER — OXYCODONE HYDROCHLORIDE 5 MG/1
5 TABLET ORAL
Status: DISCONTINUED | OUTPATIENT
Start: 2023-06-29 | End: 2023-06-29 | Stop reason: HOSPADM

## 2023-06-29 RX ORDER — OXYCODONE HYDROCHLORIDE 5 MG/1
5 TABLET ORAL EVERY 6 HOURS PRN
Qty: 4 TABLET | Refills: 0 | Status: SHIPPED | OUTPATIENT
Start: 2023-06-29 | End: 2023-07-02

## 2023-06-29 RX ORDER — CEFAZOLIN SODIUM/WATER 2 G/20 ML
2 SYRINGE (ML) INTRAVENOUS SEE ADMIN INSTRUCTIONS
Status: DISCONTINUED | OUTPATIENT
Start: 2023-06-29 | End: 2023-06-29 | Stop reason: HOSPADM

## 2023-06-29 RX ORDER — ONDANSETRON 2 MG/ML
INJECTION INTRAMUSCULAR; INTRAVENOUS PRN
Status: DISCONTINUED | OUTPATIENT
Start: 2023-06-29 | End: 2023-06-29

## 2023-06-29 RX ORDER — LIDOCAINE HYDROCHLORIDE 20 MG/ML
INJECTION, SOLUTION INFILTRATION; PERINEURAL PRN
Status: DISCONTINUED | OUTPATIENT
Start: 2023-06-29 | End: 2023-06-29

## 2023-06-29 RX ORDER — LIDOCAINE 40 MG/G
CREAM TOPICAL
Status: DISCONTINUED | OUTPATIENT
Start: 2023-06-29 | End: 2023-06-29 | Stop reason: HOSPADM

## 2023-06-29 RX ORDER — FENTANYL CITRATE 50 UG/ML
INJECTION, SOLUTION INTRAMUSCULAR; INTRAVENOUS PRN
Status: DISCONTINUED | OUTPATIENT
Start: 2023-06-29 | End: 2023-06-29

## 2023-06-29 RX ADMIN — Medication 10 MG: at 13:37

## 2023-06-29 RX ADMIN — Medication 10 MG: at 13:11

## 2023-06-29 RX ADMIN — LIDOCAINE HYDROCHLORIDE 60 MG: 20 INJECTION, SOLUTION INFILTRATION; PERINEURAL at 12:31

## 2023-06-29 RX ADMIN — FENTANYL CITRATE 50 MCG: 50 INJECTION, SOLUTION INTRAMUSCULAR; INTRAVENOUS at 12:31

## 2023-06-29 RX ADMIN — SODIUM CHLORIDE, POTASSIUM CHLORIDE, SODIUM LACTATE AND CALCIUM CHLORIDE: 600; 310; 30; 20 INJECTION, SOLUTION INTRAVENOUS at 12:24

## 2023-06-29 RX ADMIN — Medication 2 G: at 12:33

## 2023-06-29 RX ADMIN — SUGAMMADEX 200 MG: 100 INJECTION, SOLUTION INTRAVENOUS at 14:23

## 2023-06-29 RX ADMIN — PHENYLEPHRINE HYDROCHLORIDE 150 MCG: 10 INJECTION INTRAVENOUS at 14:01

## 2023-06-29 RX ADMIN — ONDANSETRON 4 MG: 2 INJECTION INTRAMUSCULAR; INTRAVENOUS at 13:31

## 2023-06-29 RX ADMIN — FENTANYL CITRATE 50 MCG: 50 INJECTION, SOLUTION INTRAMUSCULAR; INTRAVENOUS at 12:51

## 2023-06-29 RX ADMIN — MIDAZOLAM 2 MG: 1 INJECTION INTRAMUSCULAR; INTRAVENOUS at 12:18

## 2023-06-29 RX ADMIN — Medication 50 MG: at 12:31

## 2023-06-29 RX ADMIN — PROPOFOL 50 MG: 10 INJECTION, EMULSION INTRAVENOUS at 13:37

## 2023-06-29 RX ADMIN — PROPOFOL 100 MG: 10 INJECTION, EMULSION INTRAVENOUS at 12:31

## 2023-06-29 ASSESSMENT — ACTIVITIES OF DAILY LIVING (ADL)
ADLS_ACUITY_SCORE: 35

## 2023-06-29 NOTE — BRIEF OP NOTE
[facility]    Brief Operative Note    Pre-operative diagnosis: Rheumatoid arthritis involving multiple sites, unspecified whether rheumatoid factor present (H) [M06.9]  Post-operative diagnosis Same as pre-operative diagnosis    Procedure: Procedure(s):  Vagus nerve stimulator placement. left side neck  Surgeon: Surgeon(s) and Role:     * Enoc Simms MD - Primary  Anesthesia: General   Estimated Blood Loss: minimal    Drains: None  Specimens: * No specimens in log *  Findings:   Vagal nerve stimulator placed without complication in left neck  Complications: None.  Implants:   Implant Name Type Inv. Item Serial No.  Lot No. LRB No. Used Action   [employer] sales kit   [id number]  [id number] Left 1 Implanted       Emery Giordano MD, PhD  PGY-2 Neurosurgery  Please page NSGY on call with questions

## 2023-06-29 NOTE — ANESTHESIA PREPROCEDURE EVALUATION
Anesthesia Pre-Procedure Evaluation    Patient: Kady Benson   MRN: [id number] : 1965        Procedure : Procedure(s):  Vagus nerve stimulator placement. left side neck          Past Medical History:   Diagnosis Date     Angle recession glaucoma of left eye      Arthropathy     multiple sites     Depression      Glaucoma suspect      RA (rheumatoid arthritis) (H) 2012      Past Surgical History:   Procedure Laterality Date     APPENDECTOMY       ARTHROPLASTY KNEE  2012    Procedure: ARTHROPLASTY KNEE;  Right Total Knee Arthroplasty,  Left Knee Steroid Injection   ;  Surgeon: Florence Billings MD;  [facility]     ARTHROSCOPY ANKLE  3/26/2014    Procedure: ARTHROSCOPY ANKLE;;  Surgeon: Christopher Peres MD;  [facility]     ARTHROTOMY FOOT  3/26/2014    Procedure: ARTHROTOMY FOOT;  Right 1st Metatarsal Phalangeal Joint Debridement, Left Ankle Arthroscopy & Debridement, Left 2nd And 3rd Metatarsal Shortening, Left 2nd And 3rd Hammer Toe Correction Surgery   ;  Surgeon: Christopher Peres MD;  [facility]     GYN SURGERY       INJECT STEROID (LOCATION)  2012    Procedure: INJECT STEROID (LOCATION);;  Surgeon: Florence Billings MD;  [facility]     MEDIASTINOSCOPY N/A 2014    Procedure: MEDIASTINOSCOPY;  Surgeon: Christiano Hernandez MD;  [facility]     ORTHOPEDIC SURGERY       REPAIR HAMMER TOE  3/26/2014    Procedure: REPAIR HAMMER TOE;;  Surgeon: Christopher Peres MD;  [facility]      Allergies   Allergen Reactions     Duloxetine Other (See Comments)     Psychosis in combo w/ Prednisone.       Shellfish Allergy Anaphylaxis     Prednisone Other (See Comments)     Iodine-131 Other (See Comments)     Sulfasalazine Rash     Vancomycin Rash     Wellbutrin [Bupropion] Rash      Social History     Tobacco Use     Smoking status: Never     Smokeless tobacco: Never   Substance Use Topics     Alcohol use: Yes     Comment: 1 a day      Wt  Readings from Last 1 Encounters:   06/29/23 89.2 kg (196 lb 10.4 oz)        Anesthesia Evaluation   Pt has had prior anesthetic. Type: General.    No history of anesthetic complications       ROS/MED HX  ENT/Pulmonary:       Neurologic:       Cardiovascular:     (+) -----Previous cardiac testing     METS/Exercise Tolerance:     Hematologic:       Musculoskeletal: Comment: Rheumatoid arthritis        GI/Hepatic:       Renal/Genitourinary:       Endo:       Psychiatric/Substance Use:       Infectious Disease:       Malignancy:       Other:               OUTSIDE LABS:  CBC:   Lab Results   Component Value Date    WBC 6.1 09/26/2018    WBC 5.8 07/12/2018    HGB 14.7 09/26/2018    HGB 14.8 07/12/2018    HCT 45.1 09/26/2018    HCT 43.2 07/12/2018     09/26/2018     07/12/2018     BMP:   Lab Results   Component Value Date     07/12/2018     06/28/2018    POTASSIUM 4.3 07/12/2018    POTASSIUM 4.8 06/28/2018    CHLORIDE 107 07/12/2018    CHLORIDE 106 06/28/2018    CO2 27 07/12/2018    CO2 29 06/28/2018    BUN 20 07/12/2018    BUN 22 06/28/2018    CR 0.93 07/12/2018    CR 0.82 06/28/2018    GLC 94 07/12/2018    GLC 88 06/28/2018     COAGS:   Lab Results   Component Value Date    INR 0.91 09/26/2018     POC: No results found for: BGM, HCG, HCGS  HEPATIC:   Lab Results   Component Value Date    ALBUMIN 3.8 09/26/2018    PROTTOTAL 8.0 09/26/2018    ALT 33 09/26/2018    AST 25 09/26/2018    ALKPHOS 91 09/26/2018    BILITOTAL 0.4 09/26/2018     OTHER:   Lab Results   Component Value Date    A1C 4.9 09/14/2009    NAVJOT 10.4 (H) 07/12/2018    TSH 6.48 (H) 06/01/2016    CRP <2.9 09/27/2016    SED 7 09/27/2016       Anesthesia Plan    ASA Status:  3      Anesthesia Type: General.     - Airway: ETT   Induction: Intravenous.   Maintenance: Balanced.        Consents            Postoperative Care    Pain management: Multi-modal analgesia.   PONV prophylaxis: Ondansetron (or other 5HT-3), Dexamethasone or Solumedrol      Comments:                Sana Crocker MD

## 2023-06-29 NOTE — ANESTHESIA PROCEDURE NOTES
Airway       Patient location during procedure: OR       Procedure Start/Stop Times: 6/29/2023 12:33 PM  Staff -        CRNA: Aleena Pond APRN CRNA       Performed By: CRNAIndications and Patient Condition       Indications for airway management: george-procedural       Induction type:intravenous       Mask difficulty assessment: 1 - vent by mask    Final Airway Details       Final airway type: endotracheal airway       Successful airway: ETT - single  Endotracheal Airway Details        ETT size (mm): 7.0       Cuffed: yes       Successful intubation technique: direct laryngoscopy       DL Blade Type: Pinon 2       Grade View of Cords: 1       Adjucts: stylet       Bite block used: None    Post intubation assessment        Placement verified by: capnometry, equal breath sounds and chest rise        Number of attempts at approach: 1       Number of other approaches attempted: 0       Secured with: silk tape       Ease of procedure: easy       Dentition: Intact and Unchanged    Medication(s) Administered   Medication Administration Time: 6/29/2023 12:33 PM

## 2023-06-29 NOTE — ANESTHESIA POSTPROCEDURE EVALUATION
Patient: Kady Benson    Procedure: Procedure(s):  Vagus nerve stimulator placement. left side neck       Anesthesia Type:  General    Note:  Disposition: Admission   Postop Pain Control: Uneventful            Sign Out: Well controlled pain   PONV: No   Neuro/Psych: Uneventful            Sign Out: Acceptable/Baseline neuro status   Airway/Respiratory: Uneventful            Sign Out: Acceptable/Baseline resp. status   CV/Hemodynamics: Uneventful            Sign Out: Acceptable CV status; No obvious hypovolemia; No obvious fluid overload   Other NRE: NONE   DID A NON-ROUTINE EVENT OCCUR? No           Last vitals:  Vitals Value Taken Time   /65 06/29/23 1515   Temp 36.5  C (97.7  F) 06/29/23 1453   Pulse 61 06/29/23 1523   Resp 14 06/29/23 1515   SpO2 96 % 06/29/23 1523   Vitals shown include unvalidated device data.    Electronically Signed By: Aubrey Rothman MD  June 29, 2023  3:23 PM

## 2023-06-29 NOTE — ANESTHESIA POSTPROCEDURE EVALUATION
Patient: Kady Benson    Procedure: Procedure(s):  Vagus nerve stimulator placement. left side neck       Anesthesia Type:  General    Note:  Disposition: Outpatient   Postop Pain Control: Uneventful            Sign Out: Well controlled pain   PONV: No   Neuro/Psych: Uneventful            Sign Out: Acceptable/Baseline neuro status   Airway/Respiratory: Uneventful            Sign Out: Acceptable/Baseline resp. status   CV/Hemodynamics: Uneventful            Sign Out: Acceptable CV status; No obvious hypovolemia; No obvious fluid overload   Other NRE: NONE   DID A NON-ROUTINE EVENT OCCUR? No           Last vitals:  Vitals Value Taken Time   /80 06/29/23 1453   Temp     Pulse 67 06/29/23 1500   Resp     SpO2 98 % 06/29/23 1500   Vitals shown include unvalidated device data.    Electronically Signed By: Sana Crocker MD  June 29, 2023  3:01 PM

## 2023-06-29 NOTE — ANESTHESIA CARE TRANSFER NOTE
Patient: Kady Benson    Procedure: Procedure(s):  Vagus nerve stimulator placement. left side neck       Diagnosis: Rheumatoid arthritis involving multiple sites, unspecified whether rheumatoid factor present (H) [M06.9]  Diagnosis Additional Information: No value filed.    Anesthesia Type:   General     Note:    Oropharynx: oropharynx clear of all foreign objects  Level of Consciousness: awake  Oxygen Supplementation: nasal cannula    Independent Airway: airway patency satisfactory and stable  Dentition: dentition unchanged  Vital Signs Stable: post-procedure vital signs reviewed and stable  Report to RN Given: handoff report given  Patient transferred to: PACU    Handoff Report: Identifed the Patient, Identified the Reponsible Provider, Reviewed the pertinent medical history, Discussed the surgical course, Reviewed Intra-OP anesthesia mangement and issues during anesthesia, Set expectations for post-procedure period and Allowed opportunity for questions and acknowledgement of understanding      Vitals:  Vitals Value Taken Time   /80 06/29/23 1453   Temp     Pulse 73 06/29/23 1458   Resp     SpO2 98 % 06/29/23 1458   Vitals shown include unvalidated device data.    Electronically Signed By: LAURA Starks CRNA  June 29, 2023  2:58 PM

## 2023-06-29 NOTE — DISCHARGE INSTRUCTIONS
[facility]  Same-Day Surgery   Adult Discharge Orders & Instructions     For 24 hours after surgery    Get plenty of rest.  A responsible adult must stay with you for at least 24 hours after you leave the hospital.   Do not drive or use heavy equipment.  If you have weakness or tingling, don't drive or use heavy equipment until this feeling goes away.  Do not drink alcohol.  Avoid strenuous or risky activities.  Ask for help when climbing stairs.   You may feel lightheaded.  IF so, sit for a few minutes before standing.  Have someone help you get up.   If you have nausea (feel sick to your stomach): Drink only clear liquids such as apple juice, ginger ale, broth or 7-Up.  Rest may also help.  Be sure to drink enough fluids.  Move to a regular diet as you feel able.  You may have a slight fever. Call the doctor if your fever is over 100 F (37.7 C) (taken under the tongue) or lasts longer than 24 hours.  You may have a dry mouth, a sore throat, muscle aches or trouble sleeping.  These should go away after 24 hours.  Do not make important or legal decisions.   Call your doctor for any of the followin.  Signs of infection (fever, growing tenderness at the surgery site, a large amount of drainage or bleeding, severe pain, foul-smelling drainage, redness, swelling).    2. It has been over 8 to 10 hours since surgery and you are still not able to urinate (pass water).    3.  Headache for over 24 hours.    4.  Numbness, tingling or weakness the day after surgery (if you had spinal anesthesia).  To contact a doctor, call Dr Simms at [phone] at the Neurosurgery Clinic from 8 am till 5 pm --   or:    '   [phone] and ask for the resident on call for Neurosurgery (answered 24 hours a day)  '   Emergency Department:    [facility]: [phone]       (TTY for hearing impaired: [phone])    [facility]: [phone]       (TTY for hearing impaired:  [phone])

## 2023-06-29 NOTE — BRIEF OP NOTE
[facility], [place]  Neurosurgery Brief Operative Note    Pre-operative diagnosis: Rheumatoid arthritis involving multiple sites, unspecified whether rheumatoid factor present (H) [M06.9]   Post-operative diagnosis Rheumatoid arthritis involving multiple sites, unspecified whether rheumatoid factor present (H) [M06.9]   Procedure: Procedure(s):  Vagus nerve stimulator placement. left side neck   Surgeon: Enoc Simms MD, PhD   Assistants(s): None. No residents are available.   Anesthesia: General    Estimated blood loss: 5 ml   Drains: None   Specimens: None   Findings: Please see dictated note.    Condition:  Complications:  Explants:  Implants: Stable  None  None  [employer] VNS stimulator - REF [id number], S/N [id number], Lot# [id number]

## 2023-06-30 ENCOUNTER — PATIENT OUTREACH (OUTPATIENT)
Dept: NEUROSURGERY | Facility: CLINIC | Age: 58
End: 2023-06-30
Payer: COMMERCIAL

## 2023-06-30 NOTE — PROGRESS NOTES
[facility]: Post-Discharge Note  SITUATION                                                      Admission:    Admission Date: 06/29/23   Reason for Admission: Vagus nerve stimulator placement. left side neck  Discharge:   Discharge Date: 06/29/23  Discharge Diagnosis: Rheumatoid arthritis    BACKGROUND                                                      Per hospital discharge summary and inpatient provider notes:  Neurosurgery Discharge Coordination Note     Attending physician: Dr. Simms  Discharge to: Home     Current status: Patient states she  feels Ok since surgery. Has pain at incision site that is well controlled with Tylenol. She took 1 oxycodone tab last evening but is doing well with Tylenol today. Denies redness, swelling, increased tenderness, drainage, incision opening or elevated temp. Reports Incision CDI without signs of infection.  Denies current bowel or bladder issues.    Discharge instructions and medications reviewed with patient.  Follow up appointments/imaging/tests needed: 2 week post op with Dr. Simms on 7/17/23.      RN triage/on call number given: [phone]/ [phone]        ASSESSMENT           Discharge Assessment  How are your symptoms? (Red Flag symptoms escalate to triage hotline per guidelines): Improved  Do you feel your condition is stable enough to be safe at home until your provider visit?: Yes  Does the patient have their discharge instructions? : Yes  Does the patient have questions regarding their discharge instructions? : No  Were you started on any new medications or were there changes to any of your previous medications? : Yes  Does the patient have all of their medications?: Yes  Do you have questions regarding any of your medications? : No  Discharge follow-up appointment scheduled within 14 calendar days? : Yes  Discharge Follow Up Appointment Date: 07/17/23  Discharge Follow Up Appointment Scheduled with?: Specialty Care Provider         Post-op (Clinicians  Only)  Did the patient have surgery or a procedure: Yes  Incision: closed;healing  Drainage: No  Bleeding: none  Fever: No  Chills: No  Redness: No  Warmth: No  Swelling: No  Incision site pain: Yes  Closure: suture;dissolving  Eating & Drinking: eating and drinking without complaints/concerns  PO Intake: regular diet  Bowel Function: normal  Urinary Status: voiding without complaint/concerns        PLAN                                                      Outpatient Plan:  Routine postop follow-up      Future Appointments   Date Time Provider Department Center   7/3/2023  8:20 AM Aaron Ledbetter MD [facility]   7/11/2023 12:00 PM Christopher Peres MD [facility]   7/17/2023  8:45 AM Enoc Simms MD [facility]   9/5/2023  9:40 AM Florence Billings MD [facility]         For any urgent concerns, please contact our 24 hour nurse triage line: [phone] ([phone])         EARLENE NORMAN RN

## 2023-07-03 ENCOUNTER — PRE VISIT (OUTPATIENT)
Dept: ORTHOPEDICS | Facility: CLINIC | Age: 58
End: 2023-07-03
Payer: COMMERCIAL

## 2023-07-03 ENCOUNTER — ANCILLARY PROCEDURE (OUTPATIENT)
Dept: GENERAL RADIOLOGY | Facility: CLINIC | Age: 58
End: 2023-07-03
Attending: FAMILY MEDICINE
Payer: COMMERCIAL

## 2023-07-03 ENCOUNTER — OFFICE VISIT (OUTPATIENT)
Dept: ORTHOPEDICS | Facility: CLINIC | Age: 58
End: 2023-07-03
Payer: COMMERCIAL

## 2023-07-03 DIAGNOSIS — M25.552 LEFT HIP PAIN: ICD-10-CM

## 2023-07-03 DIAGNOSIS — M54.42 CHRONIC LEFT-SIDED LOW BACK PAIN WITH LEFT-SIDED SCIATICA: Primary | ICD-10-CM

## 2023-07-03 DIAGNOSIS — G89.29 CHRONIC LEFT-SIDED LOW BACK PAIN WITH LEFT-SIDED SCIATICA: Primary | ICD-10-CM

## 2023-07-03 PROCEDURE — 99214 OFFICE O/P EST MOD 30 MIN: CPT | Performed by: FAMILY MEDICINE

## 2023-07-03 PROCEDURE — 72100 X-RAY EXAM L-S SPINE 2/3 VWS: CPT | Performed by: RADIOLOGY

## 2023-07-03 NOTE — LETTER
7/3/2023      RE: Kady Benson  [address] [address]     Dear Colleague,    Thank you for referring your patient, Kady Benson, to the [facility]. Please see a copy of my visit note below.      [facility]  SPORTS & ORTHOPEDIC CLINIC VISIT     Jul 3, 2023        ASSESSMENT & PLAN    58-year-old with low back and left hip pain that is likely multifactorial.  Her most painful location is in the greater trochanter and is likely due to tendinopathy/bursitis.  She is also having some infrequent radicular sounding symptoms down the posterior thigh.    Reviewed imaging and assessment with patient in detail  Have recommended initial course of physical therapy.  Referral was provided.  We discussed the indication for greater trochanteric steroid injection and she will contact us if she would like to pursue this option.  We suspect that her radicular symptoms will improve as her hip symptoms improve, but if these become more predominant she will let us know and we will potentially pursue further evaluation of her lumbar spine.    Aaron Ledbetter MD  [facility]    -----  No chief complaint on file.      SUBJECTIVE  Kady Benson is a/an 58 year old female who is seen as a self referral for evaluation of left hip pain.     The patient is seen by themselves.  The patient is Right handed    Onset: 6 month(s) ago. Reports insidious onset without acute precipitating event.  Location of Pain: left deep hip   Worsened by: sitting for extended periods of time, walking   Better with: Sleep, hot tub  Treatments tried: Hot tub   Associated symptoms: tingling on L leg around the knee     Orthopedic/Surgical history: NO  Social History/Occupation: Clinical research        REVIEW OF SYSTEMS:  Do you have fever, chills, weight loss? No  Do you have any vision problems? No  Do you have any  chest pain or edema? No  Do you have any shortness of breath or wheezing?  No  Do you have stomach problems? No  Do you have any numbness or focal weakness? No  Do you have diabetes? No  Do you have problems with bleeding or clotting? No  Do you have an rashes or other skin lesions? No    OBJECTIVE:  There were no vitals taken for this visit.     General: alert, pleasant, no distress. sitting comfortably in chair  Back/Spine: no tenderness to palpation of spinous processes, or paraspinous musculature of lumbar spine. full ROM with flexion, extension, twisting, and side bending without pain. Straight leg raise negative bilaterally.  No significant hamstring tightness noted.  No pain in back with REYNA testing bilaterally  Neuro: SILT on bilateral lower extremities, can stand on toes and heel walk without difficulty,   Left hip: TTP over the greater trochanter.  Has some pain with terminal internal rotation in the groin.  No pain with external rotation    RADIOLOGY:    2 view x-rays of lumbar spine performed and reviewed in clinic demonstrating no acute fracture or listhesis.  Mild multilevel DJD with lower lumbar facet arthropathy is present.  Mild DJD in the bilateral hips is also noted.  See EMR for full radiology report.      Again, thank you for allowing me to participate in the care of your patient.      Sincerely,    Aaron Ledbetter MD

## 2023-07-11 ENCOUNTER — VIRTUAL VISIT (OUTPATIENT)
Dept: ORTHOPEDICS | Facility: CLINIC | Age: 58
End: 2023-07-11
Payer: COMMERCIAL

## 2023-07-11 DIAGNOSIS — M25.572 PAIN IN JOINT, ANKLE AND FOOT, LEFT: Primary | ICD-10-CM

## 2023-07-11 DIAGNOSIS — M76.829: ICD-10-CM

## 2023-07-11 PROCEDURE — 99213 OFFICE O/P EST LOW 20 MIN: CPT | Mod: 95 | Performed by: ORTHOPAEDIC SURGERY

## 2023-07-11 NOTE — LETTER
7/11/2023         RE: Kady Benson  [address] [address]        Dear Colleague,    Thank you for referring your patient, Kady Benson, to the [facility] [place]. Please see a copy of my visit note below.    Chief complaint: Left ankle pain    Kady Benson was interviewed via phone.  Patient authorized encounter    I shared with her the findings of the MRI which are significant for showing a fairly healthy left posterior tibialis tendon.    I discussed with the patient that if in fact she continues having symptoms which she confirm I would suggest to proceed with an injection into the left posterior tibialis tendon sheath under ultrasound guidance with Kenalog and lidocaine for a diagnosis of tendinitis.    If the injection is not successful improve her symptoms long-term I would suggest her to have an evaluation in the clinic and at that time also we can get an x-ray of her left foot to assess the potential for bone spur removal    There is a possibility that she may not feel any difference with the injection which would point more towards a radiated type of pain pattern    She has no restrictions.  All questions were answered.    I spent 17 minutes with the patient on the phone and today's interview.      Sincerely,        Christopher Peres MD

## 2023-07-11 NOTE — PROGRESS NOTES
Chief complaint: Left ankle pain    Kady Benson was interviewed via phone.  Patient authorized encounter    I shared with her the findings of the MRI which are significant for showing a fairly healthy left posterior tibialis tendon.    I discussed with the patient that if in fact she continues having symptoms which she confirm I would suggest to proceed with an injection into the left posterior tibialis tendon sheath under ultrasound guidance with Kenalog and lidocaine for a diagnosis of tendinitis.    If the injection is not successful improve her symptoms long-term I would suggest her to have an evaluation in the clinic and at that time also we can get an x-ray of her left foot to assess the potential for bone spur removal    There is a possibility that she may not feel any difference with the injection which would point more towards a radiated type of pain pattern    She has no restrictions.  All questions were answered.    I spent 17 minutes with the patient on the phone and today's interview.

## 2023-07-11 NOTE — NURSING NOTE
Reason For Visit:   Chief Complaint   Patient presents with     RECHECK     MR results       There were no vitals taken for this visit.         Celia Burgess, ATC

## 2023-07-15 NOTE — OP NOTE
PATIENT NAME: KADY BENSON  YOB: 1965  MRN:   [id number]  ACCOUNT:  [id number]      DATE OF PROCEDURE:  06/29/2023    PREOPERATIVE  DIAGNOSIS:  Rheumatoid arthritis    POSTOPERATIVE  DIAGNOSIS:  Rheumatoid arthritis    PROCEDURES PERFORMED:  1.  Placement of left-sided vagal nerve stimulator microregulator  2.  Use of intraoperative interrogation and electrical analysis of the vagal nerve stimulator system    ATTENDING SURGEON:  Enoc Simms MD, PhD     ASSISTANT SURGEON:  None.  No residents were available.     ANESTHESIA:  General endotracheal.     ESTIMATED BLOOD LOSS:  5 mL.     COMPLICATIONS:  None.     FINDINGS:  No problems found with the system.      IMPLANTS:  [employer] microregulator, REF [id number], S/N [id number]    INDICATIONS FOR PROCEDURE:  Ms. Kady Benson (pronounced Poorfliet) is a 58 year old female who is referred by Dr. Luna for vagus nerve stimulator implantation for rheumatoid arthritis clinical trial.  Dr. Luna has screened the patient and she has met all the necessary criteria for the clinical trial.  Dr. Luna felt that she would be a good candidate for the VNS implantation clinical trial.  Patient states that she is in her usual state of health.  Patient denies any issues with previous infections or open/non-healing wounds.  She is also not on any antiplatelet or anticoagulation therapy.  She does not take aspirin.  Patient presents for consideration of vagus nerve stimulator (VNS) placement for her rheumatoid arthritis, as part of the SPM-020 RESET-RA clinical trial.  We discussed the surgical procedure in detail.  The surgery is performed under general anesthesia and it is a same day procedure.  Horizontal incision is made in the neck, typically utilizing a natural skin crease, and platysma muscle is dissected to reach the sternocleidomastoid muscle.  We utilize a surgical corridor anterior to this muscle and typically use blunt and sharp dissection  technique to isolate the vagus nerve.  For this clinical trial, VNS stimulator is placed next to the nerve and soft casing is placed around the nerve and the stimulator.  Risks, benefits and alternative therapies were discussed including but not limited to bleeding, infection, injury to the surrounding structures, difficulty swallowing, change in voice, injury to the vagus nerve, need for more surgery, and device failure.  All questions were answered and the patient expressed understanding.  We also discussed the issues related to the device implantation itself.  The device is implanted next to or adjacent to the vagus nerve with the stimulating surface touching the vagus nerve.  It is encased in a soft case.  Therefore, if necessary, the device can be explanted.  The device is also MRI conditional.     DESCRIPTION OF PROCEDURE:  After the informed consent was verified, the patient was brought to the operating theater, was transferred to the operating table and was positioned in a supine position.  General endotracheal anesthesia was induced.  All appropriate IV access was obtained.  A small shoulder roll was placed to provide slight neck extension.  The patient's head was turned slightly to the right and the left side of the neck was exposed.  The arms were tucked to the side and all pressure points were appropriately padded.  The patient's left side of the neck was then prepped and draped in a sterile fashion.  Of note, the incision is planned so that the necklace that is used for the recharging would be over the device.  This was determined in the preoperative area with the patient sitting up.  Preoperative antibiotics were administered and sequential compression devices were applied.  Time out was performed confirming the patient's identity, procedure to be performed, site of the surgery, imaging corresponding with the patient and the administration of preoperative prophylactic antibiotic.  Approximately 6 mL of  1% Lidocaine with epinephrine, 1:100,000, mixed with 1/2% Marcaine plain, 50:50 mix, was infiltrated into the planned incision sites.     After conducting the appropriate timeout, a #10 blade scalpel was used to make a 6 cm transverse neck incision that followed her skin crease roughly two to three finger-breadth above the clavicle, centered over the medial border of sternocleidomastoid.  Self-retaining retractors were then placed to provide visualization.  Subcutaneous dissection was carried out using Metzenbaum scissors.  Hemostasis was achieved along the way using bipolar cautery.  The platysma muscle which was easily identified was divided sharply using a #10 blade.  Subplatysmal dissection was performed both superiorly and inferiorly to mobilize the tissue.      The medial edge of the sternocleidomastoid and the superior edge of the omohyoid muscles were identified.  I identified the avascular plane between these two muscles and followed this plane down using a gentle blunt dissecting technique to the carotid artery and jugular vein, palpating the artery along the way for identification.  The carotid sheath was identified.  Blunt tip Weitlander retractor was placed for better visualization of the carotid artery and the carotid sheath.    I was able to bluntly dissect around to the carotid sheath and I opened it with Metzenbaum scissors.  I identified the jugular vein laterally and the carotid artery medially and found the vagus nerve lying between the two vessels.  I dissected it circumferentially, both superiorly and inferiorly, to mobilize it for the device implantation.  The vagus nerve was freed approximately 3 cm in length circumferentially.  I then passed two vessel loops around the vagus nerve in order to isolate it.      The area was carefully examined for hemostasis.  The retractors were temporarily removed to look for any bleeding source and bipolar cautery was used for hemostasis.  I also used  Surgiflo which was placed into the surgical area and I allowed time for it to work.  Then, it was generously irrigated out.  The retractors were then again applied.    At this time, the soft VNS microregulator capsule was brought onto the field.  Using a small straight clamp, the capsule was held in an open book position.  Then, the capsule was passed under the vagus nerve until middle of the capsule was under the vagus nerve.  At this time, the clamp was released, thus placing and leaving behind the capsule.  The vessel loops were then carefully removed.    With the microregulator capsule in place, which allowed the portion of the vagus nerve to be surrounded, the [employer] microregulator was brought into the field and placed into the capsule.  Care was taken to place the head portion of the device towards the head and the stimulator edge against the vagus nerve.  Then, the capsule was closed.  The capsule was then tied shut using a 5-0 Prolene suture.  Care was taken to only make four knots.    With the proper placement of the vagal nerve stimulator device, analysis was performed.  No problems were found.    I began closing the wounds.  The area was generously irrigated and dried.  No active bleeding source was noted.  The platysma was reapproximated using 3-0 Vicryl sutures in a simple interrupted fashion.  The subcutaneous layer and the dermal layer were reapproximated using 3-0 Vicryl sutures in an inverted interrupted fashion.  The skin was reapproximated using 4-0 Monocryl suture in a subcuticular fashion.  The neck wound was cleaned with wet and dry sponges followed by ChloraPrep and then Dermabond skin glue was applied.      The patient was easily extubated on the operating table prior to being transferred to the hospitals for transport to the postanesthesia care unit for ongoing recovery.  Instrument, needle and sponge counts were correct x 2 at the end operation.  There were no  complications.    I, Baylee Simms M.D., Ph.D., Neurosurgery Attending, was present and scrubbed for the entire case and performed the entire case.      BAYLEE SIMMS MD, PHD

## 2023-07-17 ENCOUNTER — OFFICE VISIT (OUTPATIENT)
Dept: NEUROSURGERY | Facility: CLINIC | Age: 58
End: 2023-07-17
Payer: COMMERCIAL

## 2023-07-17 VITALS
OXYGEN SATURATION: 97 % | HEART RATE: 61 BPM | BODY MASS INDEX: 31.96 KG/M2 | WEIGHT: 198 LBS | DIASTOLIC BLOOD PRESSURE: 89 MMHG | RESPIRATION RATE: 16 BRPM | SYSTOLIC BLOOD PRESSURE: 134 MMHG

## 2023-07-17 DIAGNOSIS — M06.9 RHEUMATOID ARTHRITIS INVOLVING MULTIPLE SITES, UNSPECIFIED WHETHER RHEUMATOID FACTOR PRESENT (H): Primary | ICD-10-CM

## 2023-07-17 DIAGNOSIS — Z96.89 STATUS POST PLACEMENT OF VNS (VAGUS NERVE STIMULATION) DEVICE: ICD-10-CM

## 2023-07-17 DIAGNOSIS — M15.0 PRIMARY OSTEOARTHRITIS INVOLVING MULTIPLE JOINTS: ICD-10-CM

## 2023-07-17 PROCEDURE — 99024 POSTOP FOLLOW-UP VISIT: CPT | Performed by: NEUROLOGICAL SURGERY

## 2023-07-17 NOTE — PROGRESS NOTES
NEUROSURGERY    HISTORY AND PHYSICAL EXAM    Chief Complaint   Patient presents with     RECHECK     Wound check.  Rheumatoid Arthritis.  Status post VNS placement on 6/29/2023.  RESET-RA clinical trial patient.       HISTORY OF PRESENT ILLNESS  Ms. Kady Benson returns today for her wound check/post surgical follow-up.  She is s/p left side VNS placement on 6/29/2023 as part of the RESET-RA clinical trial.  Patient reports that she is doing well and there are no complaints.  She reports no pain.  There is minor discomfort under her chin when it is bumped.  She denies any fevers, chills, nausea, vomiting, dizziness, weakness, numbness or seizure like activity.  She reports that the incision is looking good and there is no redness, no drainage and no fluid collection.  She denies any changes in her voice and she denies any swallowing difficulty.  She reports no neck pain.  She is, however, experiencing her typical pain from her RA and is second guessing whether or not she should have participated in the study.  Overall, she is quite happy with the recent surgery.  She is scheduled to start her trial today, if she is cleared from neurosurgery perspective.       In summary, Ms. Kady Benson (pronounced Poorfliet) is a 58 year old female who is referred by Dr. Luna for vagus nerve stimulator implantation for rheumatoid arthritis clinical trial.  Dr. Luna has screened the patient and she has met all the necessary criteria for the clinical trial.  Dr. Luna felt that she would be a good candidate for the VNS implantation clinical trial.  Patient states that she is in her usual state of health.  Patient denies any issues with previous infections or open/non-healing wounds.  She is also not on any antiplatelet or anticoagulation therapy.  She does not take aspirin.  Patient presents for consideration of vagus nerve stimulator (VNS) placement for her rheumatoid arthritis, as part of the SPM-020 RESET-RA clinical  trial.  We discussed the surgical procedure in detail.  The surgery is performed under general anesthesia and it is a same day procedure.  Horizontal incision is made in the neck, typically utilizing a natural skin crease, and platysma muscle is dissected to reach the sternocleidomastoid muscle.  We utilize a surgical corridor anterior to this muscle and typically use blunt and sharp dissection technique to isolate the vagus nerve.  For this clinical trial, VNS stimulator is placed next to the nerve and soft casing is placed around the nerve and the stimulator.      Past Medical History:   Diagnosis Date     Angle recession glaucoma of left eye      Arthropathy     multiple sites     Depression      Glaucoma suspect      RA (rheumatoid arthritis) (H) 1/4/2012       Past Surgical History:   Procedure Laterality Date     APPENDECTOMY       ARTHROPLASTY KNEE  11/29/2012    Procedure: ARTHROPLASTY KNEE;  Right Total Knee Arthroplasty,  Left Knee Steroid Injection   ;  Surgeon: Florence Billings MD;  [facility]     ARTHROSCOPY ANKLE  3/26/2014    Procedure: ARTHROSCOPY ANKLE;;  Surgeon: Christopher Peres MD;  [facility]: [facility]     ARTHROTOMY FOOT  3/26/2014    Procedure: ARTHROTOMY FOOT;  Right 1st Metatarsal Phalangeal Joint Debridement, Left Ankle Arthroscopy & Debridement, Left 2nd And 3rd Metatarsal Shortening, Left 2nd And 3rd Hammer Toe Correction Surgery   ;  Surgeon: Christopher Peres MD;  Location: [facility]     GYN SURGERY       IMPLANT STIMULATOR VAGUS NERVE Left 6/29/2023    Procedure: Vagus nerve stimulator placement. left side neck;  Surgeon: Enoc Simms MD;  Location: [facility]     INJECT STEROID (LOCATION)  11/29/2012    Procedure: INJECT STEROID (LOCATION);;  Surgeon: Florence Billings MD;  [facility]     MEDIASTINOSCOPY N/A 8/20/2014    Procedure: MEDIASTINOSCOPY;  Surgeon: Christiano Hernandez MD;  Location: [facility]     ORTHOPEDIC SURGERY       REPAIR HAMMER TOE   3/26/2014    Procedure: REPAIR HAMMER TOE;;  Surgeon: Christopher Peres MD;  Location: [facility]       Social History     Socioeconomic History     Marital status:      Spouse name: Not on file     Number of children: Not on file     Years of education: Not on file     Highest education level: Not on file   Occupational History     Not on file   Tobacco Use     Smoking status: Never     Smokeless tobacco: Never   Substance and Sexual Activity     Alcohol use: Yes     Comment: 1 a day     Drug use: No     Sexual activity: Not on file   Other Topics Concern     Not on file   Social History Narrative     Not on file     Social Determinants of Health     Financial Resource Strain: Not on file   Food Insecurity: Not on file   Transportation Needs: Not on file   Physical Activity: Not on file   Stress: Not on file   Social Connections: Not on file   Intimate Partner Violence: Not on file   Housing Stability: Not on file       Family History   Problem Relation Age of Onset     Uterine Cancer Mother      Skin Cancer Father      Heart Disease Maternal Grandmother      Heart Disease Paternal Grandmother      Prostate Cancer Paternal Grandfather      Stomach Cancer Paternal Grandfather        Current Outpatient Medications   Medication     acetaminophen (TYLENOL ARTHRITIS PAIN) 650 MG CR tablet     Artificial Tear Solution (HYPOTEARS PF OP)     azaTHIOprine (IMURAN) 25 mg TABS half-tab     CALCIUM PO     MAGNESIUM PO     Milk Thistle 175 MG TABS     Multiple Vitamin (MULTIVITAMIN) per tablet     naproxen sodium (ALEVE) 220 MG capsule     amoxicillin (AMOXIL) 500 MG tablet     ORENCIA CLICKJECT 125 MG/ML SOAJ auto-injector     No current facility-administered medications for this visit.       Allergies   Allergen Reactions     Duloxetine Other (See Comments)     Psychosis in combo w/ Prednisone.       Shellfish Allergy Anaphylaxis     Prednisone Other (See Comments)     Iodine-131 Other (See Comments)     Sulfasalazine  Rash     Vancomycin Rash     Wellbutrin [Bupropion] Rash       REVIEW OF SYSTEMS:  Answers for HPI/ROS submitted by the patient on 6/16/2023 - updated 7/17/2023.  Also per HPI.  General Symptoms: No  Skin Symptoms: No  HENT Symptoms: Yes  EYE SYMPTOMS: Yes  HEART SYMPTOMS: No  LUNG SYMPTOMS: No  INTESTINAL SYMPTOMS: No  URINARY SYMPTOMS: No  GYNECOLOGIC SYMPTOMS: No  BREAST SYMPTOMS: No  SKELETAL SYMPTOMS: Yes  BLOOD SYMPTOMS: No  NERVOUS SYSTEM SYMPTOMS: No  MENTAL HEALTH SYMPTOMS: No  Ear pain: No  Ear discharge: No  Hearing loss: No  Tinnitus: No  Nosebleeds: No  Congestion: Yes  Sinus pain: Yes  Trouble swallowing: No   Voice hoarseness: No  Mouth sores: No  Sore throat: Yes  Tooth pain: No  Gum tenderness: No  Bleeding gums: No  Change in taste: No  Change in sense of smell: No  Dry mouth: No  Hearing aid used: No  Neck lump: No  Eye pain: No  Vision loss: No  Dry eyes: Yes  Watery eyes: No  Eye bulging: No  Double vision: No  Flashing of lights: No  Spots: No  Floaters: No  Redness: Yes  Crossed eyes: No  Tunnel Vision: No  Yellowing of eyes: No  Eye irritation: Yes  Back pain: No  Muscle aches: Yes  Neck pain: No  Swollen joints: Yes  Joint pain: Yes  Bone pain: No  Muscle cramps: Yes  Muscle weakness: No  Joint stiffness: Yes  Bone fracture: No  HISTORY OF SARCOIDOSIS - biopsy of the lymph node at the lower neck/upper chest      PHYSICAL EXAM  /89   Pulse 61   Resp 16   Wt 89.8 kg (198 lb)   SpO2 97%   BMI 31.96 kg/m      General: Awake, alert, oriented. Well nourished, well developed, she is not in any acute distress.  HEENT: Head normocephalic, atraumatic. No carotid bruit. Neck supple. Good range of motion. No palpable thyroid mass.  Extremity: Warm with no clubbing or cyanosis. No lower extremity edema.    Incisions: Left side neck incision is healing well.  No redness.  No drainage.  No fluid collection.  Skin glue dressing was already gone.    Neurological:  Awake, alert and oriented to  date, time, place and person. Speech fluent.   Pupils equal, round, reactive to light.  Extraocular movement intact.  Visual fields are full on confrontation.  Hearing is grossly normal to finger rub.   Facial sensation intact.  Face symmetric.  Tongue midline.  Uvula elevates equally.    Motor: full strength throughout.  Sensation: intact to light touch and pinpoint.  Deep tendon reflexes: trace throughout. Negative for clonus. Negative for Hassan's sign. No dysmetria.      ASSESSMENT   58 year old female  Rheumatoid arthritis  Status post vagus nerve stimulator placement on 6/29/2023 as part of the RESET-RA clinical trial    Patient is recovering well from the recent VNS placement surgery.  Her incision is healing well with no signs of infection.  She has no change in her voice and she does not have any difficulty with swallowing.  She is doing well overall.      Based on the patient's report and physical exam findings, she is cleared to proceed with the RESET-RA clinical trial.      PLAN  1.  Follow up with neurosurgery as needed.      10 minutes were spent face to face with the patient of which more than 50% of the time was spent counseling and discussing the above issues regarding diagnosis, differential, treatment options, and steps for further evaluation.  3 minutes were spent reviewing patient's chart.  10 minutes were spent on documentation for this encounter.  23 minutes total were spent on this encounter today.

## 2023-07-17 NOTE — LETTER
7/17/2023       RE: Kady Benson  [address] [facility] [address]       Dear Colleague,    Thank you for referring your patient, Kady Benson, to the [facility] NEUROSURGERY CLINIC [place] at [facility]. Please see a copy of my visit note below.    NEUROSURGERY    HISTORY AND PHYSICAL EXAM    Chief Complaint   Patient presents with    RECHECK     Wound check.  Rheumatoid Arthritis.  Status post VNS placement on 6/29/2023.  RESET-RA clinical trial patient.       HISTORY OF PRESENT ILLNESS  Ms. Kady Benson returns today for her wound check/post surgical follow-up.  She is s/p left side VNS placement on 6/29/2023 as part of the RESET-RA clinical trial.  Patient reports that she is doing well and there are no complaints.  She reports no pain.  There is minor discomfort under her chin when it is bumped.  She denies any fevers, chills, nausea, vomiting, dizziness, weakness, numbness or seizure like activity.  She reports that the incision is looking good and there is no redness, no drainage and no fluid collection.  She denies any changes in her voice and she denies any swallowing difficulty.  She reports no neck pain.  She is, however, experiencing her typical pain from her RA and is second guessing whether or not she should have participated in the study.  Overall, she is quite happy with the recent surgery.  She is scheduled to start her trial today, if she is cleared from neurosurgery perspective.       In summary, Ms. Kady Benson (pronounced Poorfliet) is a 58 year old female who is referred by Dr. Luna for vagus nerve stimulator implantation for rheumatoid arthritis clinical trial.  Dr. Luna has screened the patient and she has met all the necessary criteria for the clinical trial.  Dr. Luna felt that she would be a good candidate for the VNS implantation clinical trial.  Patient states that she is in her usual state of health.   Patient denies any issues with previous infections or open/non-healing wounds.  She is also not on any antiplatelet or anticoagulation therapy.  She does not take aspirin.  Patient presents for consideration of vagus nerve stimulator (VNS) placement for her rheumatoid arthritis, as part of the SPM-020 RESET-RA clinical trial.  We discussed the surgical procedure in detail.  The surgery is performed under general anesthesia and it is a same day procedure.  Horizontal incision is made in the neck, typically utilizing a natural skin crease, and platysma muscle is dissected to reach the sternocleidomastoid muscle.  We utilize a surgical corridor anterior to this muscle and typically use blunt and sharp dissection technique to isolate the vagus nerve.  For this clinical trial, VNS stimulator is placed next to the nerve and soft casing is placed around the nerve and the stimulator.      Past Medical History:   Diagnosis Date    Angle recession glaucoma of left eye     Arthropathy     multiple sites    Depression     Glaucoma suspect     RA (rheumatoid arthritis) (H) 1/4/2012       Past Surgical History:   Procedure Laterality Date    APPENDECTOMY      ARTHROPLASTY KNEE  11/29/2012    Procedure: ARTHROPLASTY KNEE;  Right Total Knee Arthroplasty,  Left Knee Steroid Injection   ;  Surgeon: Florence Billings MD;  [facility]    ARTHROSCOPY ANKLE  3/26/2014    Procedure: ARTHROSCOPY ANKLE;;  Surgeon: Christopher Peres MD;  Location:  OR    ARTHROTOMY FOOT  3/26/2014    Procedure: ARTHROTOMY FOOT;  Right 1st Metatarsal Phalangeal Joint Debridement, Left Ankle Arthroscopy & Debridement, Left 2nd And 3rd Metatarsal Shortening, Left 2nd And 3rd Hammer Toe Correction Surgery   ;  Surgeon: Christopher Peres MD;  Location:  OR    GYN SURGERY      IMPLANT STIMULATOR VAGUS NERVE Left 6/29/2023    Procedure: Vagus nerve stimulator placement. left side neck;  Surgeon: Enoc Simms MD;  Location:  OR     INJECT STEROID (LOCATION)  11/29/2012    Procedure: INJECT STEROID (LOCATION);;  Surgeon: Florence Billings MD;  [facility]    MEDIASTINOSCOPY N/A 8/20/2014    Procedure: MEDIASTINOSCOPY;  Surgeon: Christiano Hernandez MD;  Location: [facility]    ORTHOPEDIC SURGERY      REPAIR HAMMER TOE  3/26/2014    Procedure: REPAIR HAMMER TOE;;  Surgeon: Christopher Peres MD;  [facility]       Social History     Socioeconomic History    Marital status:      Spouse name: Not on file    Number of children: Not on file    Years of education: Not on file    Highest education level: Not on file   Occupational History    Not on file   Tobacco Use    Smoking status: Never    Smokeless tobacco: Never   Substance and Sexual Activity    Alcohol use: Yes     Comment: 1 a day    Drug use: No    Sexual activity: Not on file   Other Topics Concern    Not on file   Social History Narrative    Not on file     Social Determinants of Health     Financial Resource Strain: Not on file   Food Insecurity: Not on file   Transportation Needs: Not on file   Physical Activity: Not on file   Stress: Not on file   Social Connections: Not on file   Intimate Partner Violence: Not on file   Housing Stability: Not on file       Family History   Problem Relation Age of Onset    Uterine Cancer Mother     Skin Cancer Father     Heart Disease Maternal Grandmother     Heart Disease Paternal Grandmother     Prostate Cancer Paternal Grandfather     Stomach Cancer Paternal Grandfather        Current Outpatient Medications   Medication    acetaminophen (TYLENOL ARTHRITIS PAIN) 650 MG CR tablet    Artificial Tear Solution (HYPOTEARS PF OP)    azaTHIOprine (IMURAN) 25 mg TABS half-tab    CALCIUM PO    MAGNESIUM PO    Milk Thistle 175 MG TABS    Multiple Vitamin (MULTIVITAMIN) per tablet    naproxen sodium (ALEVE) 220 MG capsule    amoxicillin (AMOXIL) 500 MG tablet    ORENCIA CLICKJECT 125 MG/ML SOAJ auto-injector     No current  facility-administered medications for this visit.       Allergies   Allergen Reactions    Duloxetine Other (See Comments)     Psychosis in combo w/ Prednisone.      Shellfish Allergy Anaphylaxis    Prednisone Other (See Comments)    Iodine-131 Other (See Comments)    Sulfasalazine Rash    Vancomycin Rash    Wellbutrin [Bupropion] Rash       REVIEW OF SYSTEMS:  Answers for HPI/ROS submitted by the patient on 6/16/2023 - updated 7/17/2023.  Also per HPI.  General Symptoms: No  Skin Symptoms: No  HENT Symptoms: Yes  EYE SYMPTOMS: Yes  HEART SYMPTOMS: No  LUNG SYMPTOMS: No  INTESTINAL SYMPTOMS: No  URINARY SYMPTOMS: No  GYNECOLOGIC SYMPTOMS: No  BREAST SYMPTOMS: No  SKELETAL SYMPTOMS: Yes  BLOOD SYMPTOMS: No  NERVOUS SYSTEM SYMPTOMS: No  MENTAL HEALTH SYMPTOMS: No  Ear pain: No  Ear discharge: No  Hearing loss: No  Tinnitus: No  Nosebleeds: No  Congestion: Yes  Sinus pain: Yes  Trouble swallowing: No   Voice hoarseness: No  Mouth sores: No  Sore throat: Yes  Tooth pain: No  Gum tenderness: No  Bleeding gums: No  Change in taste: No  Change in sense of smell: No  Dry mouth: No  Hearing aid used: No  Neck lump: No  Eye pain: No  Vision loss: No  Dry eyes: Yes  Watery eyes: No  Eye bulging: No  Double vision: No  Flashing of lights: No  Spots: No  Floaters: No  Redness: Yes  Crossed eyes: No  Tunnel Vision: No  Yellowing of eyes: No  Eye irritation: Yes  Back pain: No  Muscle aches: Yes  Neck pain: No  Swollen joints: Yes  Joint pain: Yes  Bone pain: No  Muscle cramps: Yes  Muscle weakness: No  Joint stiffness: Yes  Bone fracture: No  HISTORY OF SARCOIDOSIS - biopsy of the lymph node at the lower neck/upper chest      PHYSICAL EXAM  /89   Pulse 61   Resp 16   Wt 89.8 kg (198 lb)   SpO2 97%   BMI 31.96 kg/m      General: Awake, alert, oriented. Well nourished, well developed, she is not in any acute distress.  HEENT: Head normocephalic, atraumatic. No carotid bruit. Neck supple. Good range of motion. No palpable  thyroid mass.  Extremity: Warm with no clubbing or cyanosis. No lower extremity edema.    Incisions: Left side neck incision is healing well.  No redness.  No drainage.  No fluid collection.  Skin glue dressing was already gone.    Neurological:  Awake, alert and oriented to date, time, place and person. Speech fluent.   Pupils equal, round, reactive to light.  Extraocular movement intact.  Visual fields are full on confrontation.  Hearing is grossly normal to finger rub.   Facial sensation intact.  Face symmetric.  Tongue midline.  Uvula elevates equally.    Motor: full strength throughout.  Sensation: intact to light touch and pinpoint.  Deep tendon reflexes: trace throughout. Negative for clonus. Negative for Hassan's sign. No dysmetria.      ASSESSMENT   58 year old female  Rheumatoid arthritis  Status post vagus nerve stimulator placement on 6/29/2023 as part of the RESET-RA clinical trial    Patient is recovering well from the recent VNS placement surgery.  Her incision is healing well with no signs of infection.  She has no change in her voice and she does not have any difficulty with swallowing.  She is doing well overall.      Based on the patient's report and physical exam findings, she is cleared to proceed with the RESET-RA clinical trial.      PLAN  1.  Follow up with neurosurgery as needed.      10 minutes were spent face to face with the patient of which more than 50% of the time was spent counseling and discussing the above issues regarding diagnosis, differential, treatment options, and steps for further evaluation.  3 minutes were spent reviewing patient's chart.  10 minutes were spent on documentation for this encounter.  23 minutes total were spent on this encounter today.        Again, thank you for allowing me to participate in the care of your patient.      Sincerely,    Enoc Simms MD

## 2023-08-10 ENCOUNTER — THERAPY VISIT (OUTPATIENT)
Dept: PHYSICAL THERAPY | Facility: CLINIC | Age: 58
End: 2023-08-10
Attending: FAMILY MEDICINE
Payer: COMMERCIAL

## 2023-08-10 DIAGNOSIS — M25.552 LEFT HIP PAIN: ICD-10-CM

## 2023-08-10 PROCEDURE — 97110 THERAPEUTIC EXERCISES: CPT | Mod: GP

## 2023-08-10 PROCEDURE — 97162 PT EVAL MOD COMPLEX 30 MIN: CPT | Mod: GP

## 2023-08-10 NOTE — PROGRESS NOTES
"PHYSICAL THERAPY EVALUATION  Type of Visit: Evaluation    See electronic medical record for Abuse and Falls Screening details.    Subjective   Pt has had a history of reconstructive surgery in B LE. Her L LE  is the chief complaint.    States she feels symptoms are sharp, shooting, \"hot\" and will have occasion where her foot is \"burning\"  She rates her hip pain at most a 5/10 with prolonged sitting, knee will be 6/10 with prolonged hiking, ankle will be 9/10 at the most and hasn't found a relation to it changing with different activities.       Presenting condition or subjective complaint:    Date of onset:      Relevant medical history:   history of RA, OA, Depression, glaucoma,   Dates & types of surgery:  Arthrotomy in L foot, Arthroscopy of L foot, Arthroplasty to B knees (dates in medical record)    Prior diagnostic imaging/testing results:      L ankle MRI impression per report: Impression:  1. Hindfoot valgus with intact posterior tibialis tendon without  substantial tendinosis or tenosynovitis.  2. Severe degenerative changes at the naviculocuneiform and  tarsometatarsal joints.  3. Possible stress response at the distal fibula.    Lumbar XR Impression via report:  1. No acute osseous abnormality.  2. Mild multilevel degenerative changes of the lumbar spine, most  pronounced at L5-S1. Findings have minimally progressed compared to  prior exam on 1/7/2015.  Prior therapy history for the same diagnosis, illness or injury:    yes, following joint related surgeries listed above.    Employment:     Retired  Hobbies/Interests:   Kayaking, biking, hiking, golfing, and potentially sailing     Patient goals for therapy:   Kayaking, biking, hiking, golfing, and potentially sailing without pain       Objective   HIP EVALUATION  POSTURE: Standing Posture: Rounded shoulders, Forward head, Lordosis increased, Thoracic kyphosis increased  GAIT: Antalgic pattern with excessive lateral shifting over L LE with increased B " sway suggestive of weak hip abductors   ROM:   (Degrees) Left AROM Left PROM  Right AROM Right PROM   Hip Flexion  115*  120   Hip Internal Rotation  20*  34   Hip External Rotation  45  48   Knee Flexion WFL*  WFL    Knee Extension  -30  -33   Lumbar Side glide WFL WFL   Lumbar Flexion WFL   Lumbar Extension WFL   Pain:   End feel:   STRENGTH:  Impairments to L LE hip flexion and abductor strength compared to R LE  FUNCTIONAL TESTS: Double Leg Squat: Pt unable to descend to make thighs parallel. Progressively transfers weight over R LE the lower she gets  PALPATION:  Tender to palpation of L LE via L IT band, L glute med, L rectus femoris (distal), L PSIS   JOINT MOBILITY: Capsular restriction to L hip flexion and IR    Assessment & Plan   CLINICAL IMPRESSIONS  Medical Diagnosis: (P) L hip pain    Treatment Diagnosis: (P) L hip pain with radicular symptoms.   Impression/Assessment: Patient is a 58 year old female with L hip pain complaints.  The following significant findings have been identified: Pain, Decreased ROM/flexibility, Decreased joint mobility, Decreased strength, Impaired balance, Impaired gait, Impaired muscle performance, Decreased activity tolerance, and Impaired posture. These impairments interfere with their ability to perform self care tasks, work tasks, recreational activities, driving , and community mobility as compared to previous level of function.     Clinical Decision Making (Complexity):  Clinical Presentation: Evolving/Changing  Clinical Presentation Rationale: based on medical and personal factors listed in PT evaluation  Clinical Decision Making (Complexity): Moderate complexity    PLAN OF CARE  Treatment Interventions:  Interventions: Gait Training, Manual Therapy, Neuromuscular Re-education, Therapeutic Activity, Therapeutic Exercise, Self-Care/Home Management    Long Term Goals     PT Goal 1  Goal Identifier: (P) Walking  Goal Description: (P) Pt will express tolerance of walking for  greater than 2 miles with minimal to no increase in hip and knee symptoms to demonstrate return to activity.  Rationale: (P) to maximize safety and independence with performance of ADLs and functional tasks  Target Date: (P) 10/19/23  PT Goal 2  Goal Identifier: (P) Sitting  Goal Description: (P) Pt will express tolerance of sitting for greater than 2 hours with minimal to no increase in symptoms to demonstrate tolerance in prolonged sitting for car rides and kayaking without limitation from LE pain.  Rationale: (P) to maximize safety and independence with performance of ADLs and functional tasks  Target Date: (P) 10/19/23      Frequency of Treatment: 1x/week  Duration of Treatment: (P) 10 weeks    Education Assessment:   Learner/Method: Patient;Demonstration;Pictures/Video;No Barriers to Learning    Risks and benefits of evaluation/treatment have been explained.   Patient/Family/caregiver agrees with Plan of Care.     Evaluation Time:     PT Eval, Moderate Complexity Minutes (76297): (P) 15       Signing Clinician: PERLITA MCGOVERN

## 2023-09-01 DIAGNOSIS — M25.562 LEFT KNEE PAIN, UNSPECIFIED CHRONICITY: Primary | ICD-10-CM

## 2023-09-21 ENCOUNTER — THERAPY VISIT (OUTPATIENT)
Dept: PHYSICAL THERAPY | Facility: CLINIC | Age: 58
End: 2023-09-21
Payer: COMMERCIAL

## 2023-09-21 DIAGNOSIS — M25.552 LEFT HIP PAIN: Primary | ICD-10-CM

## 2023-09-21 PROCEDURE — 97110 THERAPEUTIC EXERCISES: CPT | Mod: GP

## 2023-09-21 PROCEDURE — 97140 MANUAL THERAPY 1/> REGIONS: CPT | Mod: GP

## 2023-09-21 PROCEDURE — 97530 THERAPEUTIC ACTIVITIES: CPT | Mod: GP

## 2023-10-09 ENCOUNTER — THERAPY VISIT (OUTPATIENT)
Dept: PHYSICAL THERAPY | Facility: CLINIC | Age: 58
End: 2023-10-09
Payer: COMMERCIAL

## 2023-10-09 DIAGNOSIS — M25.552 LEFT HIP PAIN: Primary | ICD-10-CM

## 2023-10-09 PROCEDURE — 97140 MANUAL THERAPY 1/> REGIONS: CPT | Mod: GP

## 2023-10-09 PROCEDURE — 97110 THERAPEUTIC EXERCISES: CPT | Mod: GP

## 2023-10-23 ENCOUNTER — THERAPY VISIT (OUTPATIENT)
Dept: PHYSICAL THERAPY | Facility: CLINIC | Age: 58
End: 2023-10-23
Payer: COMMERCIAL

## 2023-10-23 DIAGNOSIS — M25.552 LEFT HIP PAIN: Primary | ICD-10-CM

## 2023-10-23 PROCEDURE — 97110 THERAPEUTIC EXERCISES: CPT | Mod: GP

## 2023-10-30 ENCOUNTER — THERAPY VISIT (OUTPATIENT)
Dept: PHYSICAL THERAPY | Facility: CLINIC | Age: 58
End: 2023-10-30
Payer: COMMERCIAL

## 2023-10-30 DIAGNOSIS — M25.552 LEFT HIP PAIN: Primary | ICD-10-CM

## 2023-10-30 PROCEDURE — 97110 THERAPEUTIC EXERCISES: CPT | Mod: GP

## 2024-08-04 ENCOUNTER — HEALTH MAINTENANCE LETTER (OUTPATIENT)
Age: 59
End: 2024-08-04

## 2025-07-05 ENCOUNTER — HEALTH MAINTENANCE LETTER (OUTPATIENT)
Age: 60
End: 2025-07-05

## 2025-08-16 ENCOUNTER — HEALTH MAINTENANCE LETTER (OUTPATIENT)
Age: 60
End: 2025-08-16

## (undated) DEVICE — SPONGE SURGIFOAM 100 1974

## (undated) DEVICE — SU DERMABOND ADVANCED .7ML DNX12

## (undated) DEVICE — PREP CHLORAPREP CLEAR 3ML 930400

## (undated) DEVICE — SU MONOCRYL 4-0 PS-2 27" UND Y426H

## (undated) DEVICE — LINEN TOWEL PACK X6 WHITE 5487

## (undated) DEVICE — SOL NACL 0.9% IRRIG 1000ML BOTTLE 2F7124

## (undated) DEVICE — LINEN TOWEL PACK X5 5464

## (undated) DEVICE — DRAPE STERI INCISE 1050

## (undated) DEVICE — SU VICRYL 3-0 SH 8X18" UND J864D

## (undated) DEVICE — VESSEL LOOPS YELLOW MAXI 31145694

## (undated) DEVICE — DRAPE IOBAN INCISE 23X17" 6650EZ

## (undated) DEVICE — RX SURGIFLO HEMOSTATIC MATRIX W/THROMBIN 8ML 2994

## (undated) DEVICE — PREP SKIN SCRUB TRAY 4461A

## (undated) DEVICE — SOL WATER IRRIG 1000ML BOTTLE 2F7114

## (undated) DEVICE — SUTURE BOOTS 051003PBX

## (undated) DEVICE — SPONGE COTTONOID 1/2X1/2" 20-04S

## (undated) DEVICE — PREP CHLORAPREP 26ML TINTED HI-LITE ORANGE 930815

## (undated) DEVICE — SU PROLENE 5-0 C-1 DA 24" 8725H

## (undated) DEVICE — DRAPE POUCH INSTRUMENT 1018

## (undated) DEVICE — SU MONOCRYL 3-0 PS-1 27" Y936H

## (undated) DEVICE — SU PROLENE 5-0 RB-1DA 36"  8556H

## (undated) DEVICE — ESU GROUND PAD ADULT W/CORD E7507

## (undated) DEVICE — PACK NEURO MINOR UMMC SNE32MNMU4

## (undated) DEVICE — GLOVE BIOGEL PI MICRO INDICATOR UNDERGLOVE SZ 8.0 48980

## (undated) DEVICE — SUCTION MANIFOLD NEPTUNE 2 SYS 4 PORT 0702-020-000

## (undated) DEVICE — SPONGE SURGIFOAM 12 1972

## (undated) DEVICE — COVER CAMERA VIDEO LASER 9X96" 04-CC227

## (undated) DEVICE — GLOVE BIOGEL PI MICRO SZ 7.5 48575

## (undated) DEVICE — PAD CHUX UNDERPAD 23X24" 7136

## (undated) RX ORDER — BUPIVACAINE HYDROCHLORIDE 5 MG/ML
INJECTION, SOLUTION EPIDURAL; INTRACAUDAL
Status: DISPENSED
Start: 2023-06-29

## (undated) RX ORDER — FENTANYL CITRATE 50 UG/ML
INJECTION, SOLUTION INTRAMUSCULAR; INTRAVENOUS
Status: DISPENSED
Start: 2023-06-29

## (undated) RX ORDER — TRIAMCINOLONE ACETONIDE 40 MG/ML
INJECTION, SUSPENSION INTRA-ARTICULAR; INTRAMUSCULAR
Status: DISPENSED
Start: 2022-10-07

## (undated) RX ORDER — CEFAZOLIN SODIUM/WATER 2 G/20 ML
SYRINGE (ML) INTRAVENOUS
Status: DISPENSED
Start: 2023-06-29

## (undated) RX ORDER — BUPIVACAINE HYDROCHLORIDE 5 MG/ML
INJECTION, SOLUTION EPIDURAL; INTRACAUDAL
Status: DISPENSED
Start: 2022-10-07

## (undated) RX ORDER — LIDOCAINE HYDROCHLORIDE AND EPINEPHRINE 10; 10 MG/ML; UG/ML
INJECTION, SOLUTION INFILTRATION; PERINEURAL
Status: DISPENSED
Start: 2023-06-29

## (undated) RX ORDER — LIDOCAINE HYDROCHLORIDE 10 MG/ML
INJECTION, SOLUTION EPIDURAL; INFILTRATION; INTRACAUDAL; PERINEURAL
Status: DISPENSED
Start: 2022-10-07